# Patient Record
Sex: FEMALE | Race: WHITE | NOT HISPANIC OR LATINO | Employment: UNEMPLOYED | URBAN - NONMETROPOLITAN AREA
[De-identification: names, ages, dates, MRNs, and addresses within clinical notes are randomized per-mention and may not be internally consistent; named-entity substitution may affect disease eponyms.]

---

## 2017-01-12 DIAGNOSIS — R30.0 DYSURIA: ICD-10-CM

## 2017-01-12 DIAGNOSIS — Z88.9 MULTIPLE ALLERGIES: ICD-10-CM

## 2017-01-12 DIAGNOSIS — K21.9 GASTROESOPHAGEAL REFLUX DISEASE WITHOUT ESOPHAGITIS: ICD-10-CM

## 2017-01-12 DIAGNOSIS — G43.C0 PERIODIC HEADACHE SYNDROME, NOT INTRACTABLE: ICD-10-CM

## 2017-01-12 DIAGNOSIS — J41.0 SIMPLE CHRONIC BRONCHITIS (HCC): ICD-10-CM

## 2017-01-12 DIAGNOSIS — F17.200 TOBACCO DEPENDENCE: ICD-10-CM

## 2017-01-12 RX ORDER — TRIAMCINOLONE ACETONIDE 55 UG/1
SPRAY, METERED NASAL
Qty: 1 INHALER | Refills: 0 | Status: SHIPPED | OUTPATIENT
Start: 2017-01-12

## 2017-03-27 ENCOUNTER — OFFICE VISIT (OUTPATIENT)
Dept: FAMILY MEDICINE CLINIC | Facility: CLINIC | Age: 47
End: 2017-03-27

## 2017-03-27 VITALS
OXYGEN SATURATION: 100 % | HEART RATE: 91 BPM | DIASTOLIC BLOOD PRESSURE: 57 MMHG | TEMPERATURE: 98.9 F | HEIGHT: 67 IN | WEIGHT: 159 LBS | BODY MASS INDEX: 24.96 KG/M2 | SYSTOLIC BLOOD PRESSURE: 114 MMHG

## 2017-03-27 DIAGNOSIS — M25.511 PAIN OF BOTH SHOULDER JOINTS: ICD-10-CM

## 2017-03-27 DIAGNOSIS — R23.2 VASOMOTOR FLUSHING: ICD-10-CM

## 2017-03-27 DIAGNOSIS — M25.512 PAIN OF BOTH SHOULDER JOINTS: ICD-10-CM

## 2017-03-27 DIAGNOSIS — G43.C0 PERIODIC HEADACHE SYNDROME, NOT INTRACTABLE: ICD-10-CM

## 2017-03-27 DIAGNOSIS — E28.39 OVARIAN FAILURE: Primary | ICD-10-CM

## 2017-03-27 DIAGNOSIS — M75.50 BURSITIS, SUBACROMIAL: ICD-10-CM

## 2017-03-27 PROCEDURE — 99213 OFFICE O/P EST LOW 20 MIN: CPT | Performed by: FAMILY MEDICINE

## 2017-03-27 RX ORDER — SULINDAC 200 MG/1
200 TABLET ORAL 2 TIMES DAILY WITH MEALS
Qty: 60 TABLET | Refills: 0 | Status: SHIPPED | OUTPATIENT
Start: 2017-03-27

## 2017-03-27 NOTE — PROGRESS NOTES
Subjective   Rashida Valles is a 46 y.o. female.     Chief Complaint   Patient presents with   • Follow-up     headaches and neck pain       History of Present Illness   Pt states she has been out of meds for approx 1m, although she does not remember last time she had meds filled; states topamax did not help in past anyway, nor the other meds she was on.    Complains of non-traumatic vivi shoulder pain; worse with movement; has been assisting in the care of her mother at home, required sig assistance with positional changes.    Pt complains also of no menstrual cycle in approx 12M; freq hot flashes; denies rashes.  The following portions of the patient's history were reviewed and updated as appropriate: allergies, current medications, past family history, past medical history, past social history, past surgical history and problem list.    Review of Systems   Constitutional: Negative for activity change, appetite change, chills, diaphoresis, fatigue, fever and unexpected weight change.   HENT: Negative for congestion, dental problem, drooling, ear discharge, ear pain, facial swelling, hearing loss, mouth sores, nosebleeds, postnasal drip, rhinorrhea, sinus pressure, sneezing, sore throat, tinnitus, trouble swallowing and voice change.    Eyes: Negative for photophobia, pain, discharge, redness, itching and visual disturbance.   Respiratory: Negative for apnea, cough, choking, chest tightness, shortness of breath, wheezing and stridor.    Cardiovascular: Negative for chest pain, palpitations and leg swelling.   Gastrointestinal: Negative for abdominal distention, abdominal pain, anal bleeding, blood in stool, constipation, diarrhea, nausea, rectal pain and vomiting.   Endocrine: Negative for cold intolerance, heat intolerance, polydipsia, polyphagia and polyuria.   Genitourinary: Negative for decreased urine volume, difficulty urinating, dysuria, enuresis, flank pain, frequency, genital sores, hematuria and  urgency.        No menses; hot flashes last approx 60-90 sec, very frequent.   Musculoskeletal: Positive for arthralgias. Negative for back pain, gait problem, joint swelling, myalgias, neck pain and neck stiffness.   Skin: Negative for color change, pallor, rash and wound.   Allergic/Immunologic: Negative for food allergies and immunocompromised state.   Neurological: Negative for dizziness, tremors, seizures, syncope, facial asymmetry, speech difficulty, weakness, light-headedness, numbness and headaches.   Hematological: Negative for adenopathy. Does not bruise/bleed easily.   Psychiatric/Behavioral: Negative for agitation, behavioral problems, confusion, decreased concentration, dysphoric mood, hallucinations, self-injury, sleep disturbance and suicidal ideas. The patient is not nervous/anxious and is not hyperactive.        Patient Active Problem List   Diagnosis   • Routine medical exam   • BMI 26.0-26.9,adult   • Periodic headache syndrome, not intractable   • Tobacco dependence   • Postmenopausal   • Personal history of ovarian cancer   • Gastroesophageal reflux disease without esophagitis   • History of anemia   • H/O iron deficiency anemia   • Dysuria   • UTI (urinary tract infection), uncomplicated       Current Outpatient Prescriptions on File Prior to Visit   Medication Sig Dispense Refill   • Diphenhydramine-APAP, sleep, (TYLENOL PM EXTRA STRENGTH PO) Take  by mouth.     • albuterol (PROVENTIL HFA;VENTOLIN HFA) 108 (90 BASE) MCG/ACT inhaler Inhale 2 puffs Every 4 (Four) Hours As Needed for wheezing. 18 g 11   • NASACORT ALLERGY 24HR 55 MCG/ACT nasal inhaler instill 2 sprays into each nostril once daily 1 inhaler 0   • propranolol (INDERAL) 10 MG tablet Take 2 tablets by mouth 2 (Two) Times a Day. 60 tablet 1   • topiramate (TOPAMAX) 25 MG tablet Take 2 tablets by mouth 2 (Two) Times a Day. 120 tablet 1   • [DISCONTINUED] levoFLOXacin (LEVAQUIN) 500 MG tablet Take 1 tablet by mouth Daily. 10 tablet 0  "    No current facility-administered medications on file prior to visit.        Social History     Social History   • Marital status:      Spouse name: N/A   • Number of children: N/A   • Years of education: N/A     Occupational History   • Not on file.     Social History Main Topics   • Smoking status: Current Every Day Smoker     Packs/day: 2.00     Types: Cigarettes   • Smokeless tobacco: Not on file   • Alcohol use No   • Drug use: No   • Sexual activity: Defer     Other Topics Concern   • Not on file     Social History Narrative       Objective   Blood pressure 114/57, pulse 91, temperature 98.9 °F (37.2 °C), height 67\" (170.2 cm), weight 159 lb (72.1 kg), SpO2 100 %.     Physical Exam   Constitutional: She is oriented to person, place, and time. She appears well-developed and well-nourished. No distress.   HENT:   Head: Normocephalic and atraumatic.   Right Ear: External ear normal.   Left Ear: External ear normal.   Nose: Nose normal.   Mouth/Throat: Oropharynx is clear and moist. No oropharyngeal exudate.   Eyes: Conjunctivae and EOM are normal. Pupils are equal, round, and reactive to light. Right eye exhibits no discharge. Left eye exhibits no discharge. No scleral icterus.   Neck: Normal range of motion. Neck supple. No JVD present. No tracheal deviation present. No thyromegaly present.   Cardiovascular: Normal rate, normal heart sounds and intact distal pulses.  Exam reveals no gallop and no friction rub.    No murmur heard.  Pulmonary/Chest: Effort normal and breath sounds normal. No stridor. No respiratory distress. She has no wheezes. She has no rales. She exhibits no tenderness.   Abdominal: Soft. Bowel sounds are normal. She exhibits no distension and no mass. There is no tenderness. There is no rebound and no guarding. No hernia.   Genitourinary:   Genitourinary Comments: Pt defers   Musculoskeletal: Normal range of motion. She exhibits tenderness (lateral subacromial space vivi, worse to " R). She exhibits no edema or deformity.   Lymphadenopathy:     She has no cervical adenopathy.   Neurological: She is alert and oriented to person, place, and time. She has normal reflexes. She displays normal reflexes. No cranial nerve deficit. She exhibits normal muscle tone. Coordination normal.   Skin: Skin is warm. No rash noted. She is not diaphoretic. No erythema. No pallor.   Psychiatric: She has a normal mood and affect. Her behavior is normal. Judgment and thought content normal.   Nursing note and vitals reviewed.      Results for orders placed or performed in visit on 10/14/16   Urine Culture   Result Value Ref Range    Urine Culture 50,000-60,000 CFU/mL Klebsiella oxytoca ESBL (C)        Susceptibility    Klebsiella oxytoca ESBL - GLORIA     Ertapenem <=1 Susceptible ug/ml     Gentamicin <=4 Susceptible ug/ml     Levofloxacin <=2 Susceptible ug/ml     Meropenem <=1 Susceptible ug/ml     Nitrofurantoin <=32 Susceptible ug/ml     Tetracycline <=4 Susceptible ug/ml     Tobramycin <=4 Susceptible ug/ml     Trimethoprim + Sulfamethoxazole <=2/38 Susceptible ug/ml   POC Urinalysis Dipstick   Result Value Ref Range    Color Yellow Yellow, Straw, Dark Yellow, Kalyn    Clarity, UA Cloudy (A) Clear    Glucose, UA Negative Negative mg/dL    Bilirubin Negative Negative    Ketones, UA Negative Negative    Specific Gravity  1.015 1.005 - 1.030    Blood, UA Large (A) Negative    pH, Urine 5.5 5.0 - 8.0    Protein, POC 30 mg/dL (A) Negative mg/dL    Urobilinogen, UA Normal Normal    Leukocytes Large (3+) (A) Negative    Nitrite, UA Negative Negative       Assessment/Plan   Problems Addressed this Visit        Cardiovascular and Mediastinum    Periodic headache syndrome, not intractable    Relevant Medications    sulindac (CLINORIL) 200 MG tablet      Other Visit Diagnoses     Ovarian failure    -  Primary    Relevant Orders    Ambulatory Referral to Gynecology    Vasomotor flushing        Relevant Orders    Ambulatory  Referral to Gynecology    Bursitis, subacromial        Relevant Medications    sulindac (CLINORIL) 200 MG tablet    Pain of both shoulder joints        Relevant Medications    sulindac (CLINORIL) 200 MG tablet               Discussion/Summary:  Discussed plan of care in detail with pt today; pt verb understanding and agrees; counseled for approx 15 min of total 25 min exam time.    Pt verb understanding of stretching exercises for bursitis; ice compress daily and prn; sulindac bid with food prn.    To gyn for eval of menstrual issues.  There are no Patient Instructions on file for this visit.

## 2017-03-28 DIAGNOSIS — Z88.9 MULTIPLE ALLERGIES: ICD-10-CM

## 2017-03-28 DIAGNOSIS — M75.50 BURSITIS, SUBACROMIAL: ICD-10-CM

## 2017-03-28 DIAGNOSIS — G43.C0 PERIODIC HEADACHE SYNDROME, NOT INTRACTABLE: ICD-10-CM

## 2017-03-28 DIAGNOSIS — M25.511 PAIN OF BOTH SHOULDER JOINTS: ICD-10-CM

## 2017-03-28 DIAGNOSIS — K21.9 GASTROESOPHAGEAL REFLUX DISEASE WITHOUT ESOPHAGITIS: ICD-10-CM

## 2017-03-28 DIAGNOSIS — M25.512 PAIN OF BOTH SHOULDER JOINTS: ICD-10-CM

## 2017-03-28 DIAGNOSIS — R30.0 DYSURIA: ICD-10-CM

## 2017-03-28 DIAGNOSIS — F17.200 TOBACCO DEPENDENCE: ICD-10-CM

## 2017-03-28 DIAGNOSIS — J41.0 SIMPLE CHRONIC BRONCHITIS (HCC): ICD-10-CM

## 2017-03-28 RX ORDER — TRIAMCINOLONE ACETONIDE 55 UG/1
SPRAY, METERED NASAL
Refills: 0 | OUTPATIENT
Start: 2017-03-28

## 2017-03-28 RX ORDER — PROPRANOLOL HYDROCHLORIDE 10 MG/1
TABLET ORAL
Qty: 60 TABLET | Refills: 1 | OUTPATIENT
Start: 2017-03-28

## 2017-03-28 RX ORDER — TOPIRAMATE 25 MG/1
TABLET ORAL
Qty: 120 TABLET | Refills: 1 | OUTPATIENT
Start: 2017-03-28

## 2017-03-28 RX ORDER — SULINDAC 200 MG/1
TABLET ORAL
Qty: 60 TABLET | Refills: 0 | OUTPATIENT
Start: 2017-03-28

## 2017-04-23 DIAGNOSIS — Z88.9 MULTIPLE ALLERGIES: ICD-10-CM

## 2017-04-23 DIAGNOSIS — J41.0 SIMPLE CHRONIC BRONCHITIS (HCC): ICD-10-CM

## 2017-04-23 DIAGNOSIS — F17.200 TOBACCO DEPENDENCE: ICD-10-CM

## 2017-04-23 DIAGNOSIS — K21.9 GASTROESOPHAGEAL REFLUX DISEASE WITHOUT ESOPHAGITIS: ICD-10-CM

## 2017-04-23 DIAGNOSIS — G43.C0 PERIODIC HEADACHE SYNDROME, NOT INTRACTABLE: ICD-10-CM

## 2017-04-23 DIAGNOSIS — R30.0 DYSURIA: ICD-10-CM

## 2017-04-24 RX ORDER — TOPIRAMATE 25 MG/1
TABLET ORAL
Qty: 120 TABLET | Refills: 1 | Status: SHIPPED | OUTPATIENT
Start: 2017-04-24

## 2019-12-24 ENCOUNTER — HOSPITAL ENCOUNTER (EMERGENCY)
Facility: HOSPITAL | Age: 49
Discharge: HOME | End: 2019-12-24
Attending: STUDENT IN AN ORGANIZED HEALTH CARE EDUCATION/TRAINING PROGRAM | Admitting: STUDENT IN AN ORGANIZED HEALTH CARE EDUCATION/TRAINING PROGRAM

## 2019-12-24 ENCOUNTER — APPOINTMENT (OUTPATIENT)
Dept: GENERAL RADIOLOGY | Facility: HOSPITAL | Age: 49
End: 2019-12-24

## 2019-12-24 VITALS
OXYGEN SATURATION: 96 % | HEIGHT: 67 IN | HEART RATE: 86 BPM | TEMPERATURE: 98.5 F | BODY MASS INDEX: 31.17 KG/M2 | DIASTOLIC BLOOD PRESSURE: 76 MMHG | RESPIRATION RATE: 20 BRPM | WEIGHT: 198.6 LBS | SYSTOLIC BLOOD PRESSURE: 98 MMHG

## 2019-12-24 DIAGNOSIS — R00.2 HEART PALPITATIONS: ICD-10-CM

## 2019-12-24 DIAGNOSIS — S46.912A LEFT SHOULDER STRAIN, INITIAL ENCOUNTER: Primary | ICD-10-CM

## 2019-12-24 LAB
ALBUMIN SERPL-MCNC: 4.2 G/DL (ref 3.5–5.2)
ALBUMIN/GLOB SERPL: 1.3 G/DL
ALP SERPL-CCNC: 150 U/L (ref 39–117)
ALT SERPL W P-5'-P-CCNC: 36 U/L (ref 1–33)
ANION GAP SERPL CALCULATED.3IONS-SCNC: 13.4 MMOL/L (ref 5–15)
AST SERPL-CCNC: 19 U/L (ref 1–32)
BASOPHILS # BLD AUTO: 0.04 10*3/MM3 (ref 0–0.2)
BASOPHILS NFR BLD AUTO: 0.6 % (ref 0–1.5)
BILIRUB SERPL-MCNC: <0.2 MG/DL (ref 0.2–1.2)
BUN BLD-MCNC: 15 MG/DL (ref 6–20)
BUN/CREAT SERPL: 18.1 (ref 7–25)
CALCIUM SPEC-SCNC: 9.4 MG/DL (ref 8.6–10.5)
CHLORIDE SERPL-SCNC: 105 MMOL/L (ref 98–107)
CO2 SERPL-SCNC: 20.6 MMOL/L (ref 22–29)
CREAT BLD-MCNC: 0.83 MG/DL (ref 0.57–1)
D DIMER PPP FEU-MCNC: 0.48 MCGFEU/ML (ref 0–0.57)
DEPRECATED RDW RBC AUTO: 41.8 FL (ref 37–54)
EOSINOPHIL # BLD AUTO: 0.13 10*3/MM3 (ref 0–0.4)
EOSINOPHIL NFR BLD AUTO: 1.8 % (ref 0.3–6.2)
ERYTHROCYTE [DISTWIDTH] IN BLOOD BY AUTOMATED COUNT: 12.7 % (ref 12.3–15.4)
GFR SERPL CREATININE-BSD FRML MDRD: 73 ML/MIN/1.73
GLOBULIN UR ELPH-MCNC: 3.3 GM/DL
GLUCOSE BLD-MCNC: 192 MG/DL (ref 65–99)
HCT VFR BLD AUTO: 43.3 % (ref 34–46.6)
HGB BLD-MCNC: 14.4 G/DL (ref 12–15.9)
HOLD SPECIMEN: NORMAL
HOLD SPECIMEN: NORMAL
IMM GRANULOCYTES # BLD AUTO: 0.01 10*3/MM3 (ref 0–0.05)
IMM GRANULOCYTES NFR BLD AUTO: 0.1 % (ref 0–0.5)
LYMPHOCYTES # BLD AUTO: 2.36 10*3/MM3 (ref 0.7–3.1)
LYMPHOCYTES NFR BLD AUTO: 33.4 % (ref 19.6–45.3)
MCH RBC QN AUTO: 29.8 PG (ref 26.6–33)
MCHC RBC AUTO-ENTMCNC: 33.3 G/DL (ref 31.5–35.7)
MCV RBC AUTO: 89.5 FL (ref 79–97)
MONOCYTES # BLD AUTO: 0.31 10*3/MM3 (ref 0.1–0.9)
MONOCYTES NFR BLD AUTO: 4.4 % (ref 5–12)
NEUTROPHILS # BLD AUTO: 4.21 10*3/MM3 (ref 1.7–7)
NEUTROPHILS NFR BLD AUTO: 59.7 % (ref 42.7–76)
NRBC BLD AUTO-RTO: 0 /100 WBC (ref 0–0.2)
NT-PROBNP SERPL-MCNC: 111.3 PG/ML (ref 5–900)
PLATELET # BLD AUTO: 306 10*3/MM3 (ref 140–450)
PMV BLD AUTO: 10 FL (ref 6–12)
POTASSIUM BLD-SCNC: 4.1 MMOL/L (ref 3.5–5.2)
PROT SERPL-MCNC: 7.5 G/DL (ref 6–8.5)
RBC # BLD AUTO: 4.84 10*6/MM3 (ref 3.77–5.28)
SODIUM BLD-SCNC: 139 MMOL/L (ref 136–145)
TROPONIN I SERPL-MCNC: 0 NG/ML (ref 0–0.05)
TROPONIN T SERPL-MCNC: <0.01 NG/ML (ref 0–0.03)
WBC NRBC COR # BLD: 7.06 10*3/MM3 (ref 3.4–10.8)
WHOLE BLOOD HOLD SPECIMEN: NORMAL
WHOLE BLOOD HOLD SPECIMEN: NORMAL

## 2019-12-24 PROCEDURE — 94799 UNLISTED PULMONARY SVC/PX: CPT

## 2019-12-24 PROCEDURE — 83880 ASSAY OF NATRIURETIC PEPTIDE: CPT | Performed by: NURSE PRACTITIONER

## 2019-12-24 PROCEDURE — 71045 X-RAY EXAM CHEST 1 VIEW: CPT

## 2019-12-24 PROCEDURE — 85025 COMPLETE CBC W/AUTO DIFF WBC: CPT | Performed by: STUDENT IN AN ORGANIZED HEALTH CARE EDUCATION/TRAINING PROGRAM

## 2019-12-24 PROCEDURE — 93005 ELECTROCARDIOGRAM TRACING: CPT

## 2019-12-24 PROCEDURE — 84484 ASSAY OF TROPONIN QUANT: CPT | Performed by: STUDENT IN AN ORGANIZED HEALTH CARE EDUCATION/TRAINING PROGRAM

## 2019-12-24 PROCEDURE — 96372 THER/PROPH/DIAG INJ SC/IM: CPT

## 2019-12-24 PROCEDURE — 73030 X-RAY EXAM OF SHOULDER: CPT

## 2019-12-24 PROCEDURE — 80053 COMPREHEN METABOLIC PANEL: CPT | Performed by: STUDENT IN AN ORGANIZED HEALTH CARE EDUCATION/TRAINING PROGRAM

## 2019-12-24 PROCEDURE — 25010000002 METHYLPREDNISOLONE PER 125 MG: Performed by: NURSE PRACTITIONER

## 2019-12-24 PROCEDURE — 25010000002 KETOROLAC TROMETHAMINE PER 15 MG: Performed by: NURSE PRACTITIONER

## 2019-12-24 PROCEDURE — 94640 AIRWAY INHALATION TREATMENT: CPT

## 2019-12-24 PROCEDURE — 99283 EMERGENCY DEPT VISIT LOW MDM: CPT

## 2019-12-24 PROCEDURE — 85379 FIBRIN DEGRADATION QUANT: CPT | Performed by: NURSE PRACTITIONER

## 2019-12-24 PROCEDURE — 93005 ELECTROCARDIOGRAM TRACING: CPT | Performed by: STUDENT IN AN ORGANIZED HEALTH CARE EDUCATION/TRAINING PROGRAM

## 2019-12-24 RX ORDER — KETOROLAC TROMETHAMINE 30 MG/ML
30 INJECTION, SOLUTION INTRAMUSCULAR; INTRAVENOUS ONCE
Status: DISCONTINUED | OUTPATIENT
Start: 2019-12-24 | End: 2019-12-24

## 2019-12-24 RX ORDER — KETOROLAC TROMETHAMINE 30 MG/ML
30 INJECTION, SOLUTION INTRAMUSCULAR; INTRAVENOUS ONCE
Status: COMPLETED | OUTPATIENT
Start: 2019-12-24 | End: 2019-12-24

## 2019-12-24 RX ORDER — MELOXICAM 7.5 MG/1
7.5 TABLET ORAL DAILY
Qty: 14 TABLET | Refills: 0 | Status: SHIPPED | OUTPATIENT
Start: 2019-12-24

## 2019-12-24 RX ORDER — IPRATROPIUM BROMIDE AND ALBUTEROL SULFATE 2.5; .5 MG/3ML; MG/3ML
3 SOLUTION RESPIRATORY (INHALATION) ONCE
Status: COMPLETED | OUTPATIENT
Start: 2019-12-24 | End: 2019-12-24

## 2019-12-24 RX ORDER — METHYLPREDNISOLONE SODIUM SUCCINATE 125 MG/2ML
80 INJECTION, POWDER, LYOPHILIZED, FOR SOLUTION INTRAMUSCULAR; INTRAVENOUS ONCE
Status: COMPLETED | OUTPATIENT
Start: 2019-12-24 | End: 2019-12-24

## 2019-12-24 RX ORDER — METHYLPREDNISOLONE SODIUM SUCCINATE 125 MG/2ML
125 INJECTION, POWDER, LYOPHILIZED, FOR SOLUTION INTRAMUSCULAR; INTRAVENOUS ONCE
Status: DISCONTINUED | OUTPATIENT
Start: 2019-12-24 | End: 2019-12-24

## 2019-12-24 RX ORDER — SODIUM CHLORIDE 0.9 % (FLUSH) 0.9 %
10 SYRINGE (ML) INJECTION AS NEEDED
Status: DISCONTINUED | OUTPATIENT
Start: 2019-12-24 | End: 2019-12-24 | Stop reason: HOSPADM

## 2019-12-24 RX ADMIN — IPRATROPIUM BROMIDE AND ALBUTEROL SULFATE 3 ML: .5; 3 SOLUTION RESPIRATORY (INHALATION) at 19:11

## 2019-12-24 RX ADMIN — KETOROLAC TROMETHAMINE 30 MG: 30 INJECTION, SOLUTION INTRAMUSCULAR at 19:48

## 2019-12-24 RX ADMIN — METHYLPREDNISOLONE SODIUM SUCCINATE 80 MG: 125 INJECTION, POWDER, FOR SOLUTION INTRAMUSCULAR; INTRAVENOUS at 19:48

## 2019-12-24 NOTE — ED PROVIDER NOTES
Subjective   History of Present Illness  This is a 49-year-old female who comes in today complaining of left shoulder pain.  She reports she was picking up her grandchild 6 days ago when she felt pain in her left shoulder.  At that time is been hurting with any movement.  She also reports about 4 days ago she started having some heart palpitations.  She denies any chest pain, nausea, vomiting.  She reports she does have COPD and has been using her inhaler more than normal.  Review of Systems   Constitutional: Negative.    HENT: Negative.    Eyes: Negative.    Respiratory: Negative.    Cardiovascular: Positive for palpitations.   Gastrointestinal: Negative.    Endocrine: Negative.    Genitourinary: Negative.    Musculoskeletal: Positive for arthralgias.   Skin: Negative.    Allergic/Immunologic: Negative.    Neurological: Negative.    Hematological: Negative.    Psychiatric/Behavioral: Negative.        Past Medical History:   Diagnosis Date   • Acid reflux    • Anemia    • Asthma    • COPD (chronic obstructive pulmonary disease) (CMS/[facility])    • Fibromyalgia, primary    • Fractures    • Low back pain    • Neck pain    • Ovarian cyst    • Shoulder pain        Allergies   Allergen Reactions   • Baclofen    • Morphine And Related        Past Surgical History:   Procedure Laterality Date   • CHOLECYSTECTOMY     • COSMETIC SURGERY     • HYSTERECTOMY     • MOUTH SURGERY     • TUBAL ABDOMINAL LIGATION         Family History   Problem Relation Age of Onset   • Cancer Mother    • Diabetes Mother    • Heart disease Mother    • Cancer Father    • Diabetes Father    • Heart disease Father    • Arthritis Father    • Asthma Father        Social History     Socioeconomic History   • Marital status:      Spouse name: Not on file   • Number of children: Not on file   • Years of education: Not on file   • Highest education level: Not on file   Tobacco Use   • Smoking status: Current Every Day Smoker     Packs/day: 2.00      Types: Cigarettes   Substance and Sexual Activity   • Alcohol use: No   • Drug use: No   • Sexual activity: Defer           Objective   Physical Exam   Constitutional: She appears well-developed and well-nourished.   Nursing note and vitals reviewed.  GEN: No acute distress  Head: Normocephalic, atraumatic  Eyes: Pupils equal round reactive to light  ENT: Posterior pharynx normal in appearance, oral mucosa is moist  Chest: Nontender to palpation  Cardiovascular: Regular rate  Lungs: Clear to auscultation bilaterally  Abdomen: Soft, nontender, nondistended, no peritoneal signs  Extremities: No edema, normal appearance, tender left shoulder with limited ROM due to pain.   Neuro: GCS 15  Psych: Mood and affect are appropriate      Procedures           ED Course  ED Course as of Dec 26 1232   Tue Dec 24, 2019   1936 I discussed the findings with the patient.  Have advised her to follow-up with her primary care provider in the next 24 to 48 hours.  Have given her strict return to care instructions and she is agreeable with this plan of care.    [TW]   Wed Dec 25, 2019   0740 EKG interpreted by me reveals a sinus rhythm rate 95.  No increasing change.  Normal EKG.    [PF]      ED Course User Index  [PF] Gunnar Martines, DO  [TW] Viridiana Stein, APRN                                               MDM  Number of Diagnoses or Management Options     Amount and/or Complexity of Data Reviewed  Clinical lab tests: ordered and reviewed  Tests in the radiology section of CPT®: ordered and reviewed  Review and summarize past medical records: yes  Discuss the patient with other providers: yes  Independent visualization of images, tracings, or specimens: yes    Risk of Complications, Morbidity, and/or Mortality  Presenting problems: moderate  Diagnostic procedures: moderate  Management options: moderate        Final diagnoses:   Left shoulder strain, initial encounter   Heart palpitations            Viridiana Stein,  APRN  12/24/19 1937       Viridiana Stein, APRN  12/26/19 1236

## 2021-05-01 ENCOUNTER — HOSPITAL ENCOUNTER (EMERGENCY)
Facility: HOSPITAL | Age: 51
Discharge: ANOTHER HOSPITAL | End: 2021-05-01
Attending: EMERGENCY MEDICINE | Admitting: EMERGENCY MEDICINE

## 2021-05-01 ENCOUNTER — APPOINTMENT (OUTPATIENT)
Dept: GENERAL RADIOLOGY | Facility: HOSPITAL | Age: 51
End: 2021-05-01

## 2021-05-01 VITALS
BODY MASS INDEX: 32.59 KG/M2 | DIASTOLIC BLOOD PRESSURE: 91 MMHG | WEIGHT: 202.8 LBS | HEIGHT: 66 IN | RESPIRATION RATE: 18 BRPM | SYSTOLIC BLOOD PRESSURE: 148 MMHG | HEART RATE: 75 BPM | TEMPERATURE: 98.8 F | OXYGEN SATURATION: 98 %

## 2021-05-01 DIAGNOSIS — M65.9 FLEXOR TENOSYNOVITIS OF FINGER: ICD-10-CM
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
## 2021-05-01 LAB
ALBUMIN SERPL-MCNC: 4.3 G/DL (ref 3.5–5.2)
ALBUMIN/GLOB SERPL: 1.3 G/DL
ALP SERPL-CCNC: 160 U/L (ref 39–117)
ALT SERPL W P-5'-P-CCNC: 131 U/L (ref 1–33)
ANION GAP SERPL CALCULATED.3IONS-SCNC: 11.4 MMOL/L (ref 5–15)
AST SERPL-CCNC: 121 U/L (ref 1–32)
BASOPHILS # BLD AUTO: 0.05 10*3/MM3 (ref 0–0.2)
BASOPHILS NFR BLD AUTO: 0.5 % (ref 0–1.5)
BILIRUB SERPL-MCNC: 0.5 MG/DL (ref 0–1.2)
BUN SERPL-MCNC: 13 MG/DL (ref 6–20)
BUN/CREAT SERPL: 15.7 (ref 7–25)
CALCIUM SPEC-SCNC: 9.5 MG/DL (ref 8.6–10.5)
CHLORIDE SERPL-SCNC: 101 MMOL/L (ref 98–107)
CO2 SERPL-SCNC: 23.6 MMOL/L (ref 22–29)
CREAT SERPL-MCNC: 0.83 MG/DL (ref 0.57–1)
CRP SERPL-MCNC: 2.07 MG/DL (ref 0–0.5)
D-LACTATE SERPL-SCNC: 0.5 MMOL/L (ref 0.5–2)
DEPRECATED RDW RBC AUTO: 41.8 FL (ref 37–54)
EOSINOPHIL # BLD AUTO: 0.08 10*3/MM3 (ref 0–0.4)
EOSINOPHIL NFR BLD AUTO: 0.8 % (ref 0.3–6.2)
ERYTHROCYTE [DISTWIDTH] IN BLOOD BY AUTOMATED COUNT: 12.6 % (ref 12.3–15.4)
ERYTHROCYTE [SEDIMENTATION RATE] IN BLOOD: 17 MM/HR (ref 0–20)
GFR SERPL CREATININE-BSD FRML MDRD: 72 ML/MIN/1.73
GLOBULIN UR ELPH-MCNC: 3.2 GM/DL
GLUCOSE SERPL-MCNC: 96 MG/DL (ref 65–99)
HCT VFR BLD AUTO: 43.7 % (ref 34–46.6)
HGB BLD-MCNC: 14.6 G/DL (ref 12–15.9)
IMM GRANULOCYTES # BLD AUTO: 0.02 10*3/MM3 (ref 0–0.05)
IMM GRANULOCYTES NFR BLD AUTO: 0.2 % (ref 0–0.5)
LYMPHOCYTES # BLD AUTO: 2.91 10*3/MM3 (ref 0.7–3.1)
LYMPHOCYTES NFR BLD AUTO: 28.4 % (ref 19.6–45.3)
MCH RBC QN AUTO: 29.9 PG (ref 26.6–33)
MCHC RBC AUTO-ENTMCNC: 33.4 G/DL (ref 31.5–35.7)
MCV RBC AUTO: 89.4 FL (ref 79–97)
MONOCYTES # BLD AUTO: 0.55 10*3/MM3 (ref 0.1–0.9)
MONOCYTES NFR BLD AUTO: 5.4 % (ref 5–12)
NEUTROPHILS NFR BLD AUTO: 6.65 10*3/MM3 (ref 1.7–7)
NEUTROPHILS NFR BLD AUTO: 64.7 % (ref 42.7–76)
NRBC BLD AUTO-RTO: 0 /100 WBC (ref 0–0.2)
PLATELET # BLD AUTO: 318 10*3/MM3 (ref 140–450)
PMV BLD AUTO: 9.6 FL (ref 6–12)
POTASSIUM SERPL-SCNC: 4 MMOL/L (ref 3.5–5.2)
PROCALCITONIN SERPL-MCNC: 0.2 NG/ML (ref 0–0.25)
PROT SERPL-MCNC: 7.5 G/DL (ref 6–8.5)
RBC # BLD AUTO: 4.89 10*6/MM3 (ref 3.77–5.28)
SODIUM SERPL-SCNC: 136 MMOL/L (ref 136–145)
WBC # BLD AUTO: 10.26 10*3/MM3 (ref 3.4–10.8)

## 2021-05-01 PROCEDURE — 96375 TX/PRO/DX INJ NEW DRUG ADDON: CPT

## 2021-05-01 PROCEDURE — 86140 C-REACTIVE PROTEIN: CPT | Performed by: EMERGENCY MEDICINE

## 2021-05-01 PROCEDURE — 85025 COMPLETE CBC W/AUTO DIFF WBC: CPT | Performed by: EMERGENCY MEDICINE

## 2021-05-01 PROCEDURE — 96365 THER/PROPH/DIAG IV INF INIT: CPT

## 2021-05-01 PROCEDURE — 85651 RBC SED RATE NONAUTOMATED: CPT | Performed by: EMERGENCY MEDICINE

## 2021-05-01 PROCEDURE — 99283 EMERGENCY DEPT VISIT LOW MDM: CPT

## 2021-05-01 PROCEDURE — 25010000002 FENTANYL CITRATE (PF) 100 MCG/2ML SOLUTION: Performed by: EMERGENCY MEDICINE

## 2021-05-01 PROCEDURE — 73140 X-RAY EXAM OF FINGER(S): CPT

## 2021-05-01 PROCEDURE — 25010000002 KETOROLAC TROMETHAMINE PER 15 MG: Performed by: EMERGENCY MEDICINE

## 2021-05-01 PROCEDURE — 25010000003 AMPICILLIN-SULBACTAM PER 1.5 G: Performed by: EMERGENCY MEDICINE

## 2021-05-01 PROCEDURE — 84145 PROCALCITONIN (PCT): CPT | Performed by: EMERGENCY MEDICINE

## 2021-05-01 PROCEDURE — 99284 EMERGENCY DEPT VISIT MOD MDM: CPT

## 2021-05-01 PROCEDURE — 80053 COMPREHEN METABOLIC PANEL: CPT | Performed by: EMERGENCY MEDICINE

## 2021-05-01 PROCEDURE — 83605 ASSAY OF LACTIC ACID: CPT | Performed by: EMERGENCY MEDICINE

## 2021-05-01 RX ORDER — KETOROLAC TROMETHAMINE 30 MG/ML
30 INJECTION, SOLUTION INTRAMUSCULAR; INTRAVENOUS ONCE
Status: COMPLETED | OUTPATIENT
Start: 2021-05-01 | End: 2021-05-01

## 2021-05-01 RX ORDER — FENTANYL CITRATE 50 UG/ML
50 INJECTION, SOLUTION INTRAMUSCULAR; INTRAVENOUS ONCE
Status: COMPLETED | OUTPATIENT
Start: 2021-05-01 | End: 2021-05-01

## 2021-05-01 RX ADMIN — SODIUM CHLORIDE 3 G: 900 INJECTION INTRAVENOUS at 20:51

## 2021-05-01 RX ADMIN — FENTANYL CITRATE 50 MCG: 50 INJECTION, SOLUTION INTRAMUSCULAR; INTRAVENOUS at 20:51

## 2021-05-01 RX ADMIN — KETOROLAC TROMETHAMINE 30 MG: 30 INJECTION, SOLUTION INTRAMUSCULAR; INTRAVENOUS at 20:51

## 2021-05-02 NOTE — ED PROVIDER NOTES
"Subjective   50-year-old female presenting with dog bite.  She states that yesterday she was bitten on the left index finger by a \"talisha\" (Chihuahua/dachshund mix).  She tells me that she immediately washed it out and use peroxide.  She states that all day today she has had increasing pain in the finger, cannot straighten it out, has had redness extending down to the palm of the hand.  No fevers, vomiting or other complaints.          Review of Systems   Constitutional: Negative.    HENT: Negative.    Eyes: Negative.    Respiratory: Negative.    Cardiovascular: Negative.    Gastrointestinal: Negative.    Genitourinary: Negative.    Musculoskeletal: Positive for arthralgias and joint swelling.   Skin: Positive for color change and wound.   Neurological: Negative.    Psychiatric/Behavioral: Negative.        Past Medical History:   Diagnosis Date   • Acid reflux    • Anemia    • Asthma    • COPD (chronic obstructive pulmonary disease) (CMS/[facility])    • Fibromyalgia, primary    • Fractures    • Low back pain    • Neck pain    • Ovarian cyst    • Shoulder pain        Allergies   Allergen Reactions   • Baclofen    • Morphine And Related        Past Surgical History:   Procedure Laterality Date   • CHOLECYSTECTOMY     • COSMETIC SURGERY     • HYSTERECTOMY     • MOUTH SURGERY     • TUBAL ABDOMINAL LIGATION         Family History   Problem Relation Age of Onset   • Cancer Mother    • Diabetes Mother    • Heart disease Mother    • Cancer Father    • Diabetes Father    • Heart disease Father    • Arthritis Father    • Asthma Father        Social History     Socioeconomic History   • Marital status:      Spouse name: Not on file   • Number of children: Not on file   • Years of education: Not on file   • Highest education level: Not on file   Tobacco Use   • Smoking status: Current Every Day Smoker     Packs/day: 2.00     Types: Cigarettes   Substance and Sexual Activity   • Alcohol use: No   • Drug use: No   • " Sexual activity: Defer           Objective   Physical Exam  Constitutional:       General: She is not in acute distress.     Appearance: Normal appearance. She is not ill-appearing, toxic-appearing or diaphoretic.   HENT:      Head: Normocephalic and atraumatic.      Right Ear: External ear normal.      Left Ear: External ear normal.      Nose: Nose normal.      Mouth/Throat:      Mouth: Mucous membranes are moist.      Pharynx: Oropharynx is clear.   Eyes:      Extraocular Movements: Extraocular movements intact.      Conjunctiva/sclera: Conjunctivae normal.      Pupils: Pupils are equal, round, and reactive to light.   Cardiovascular:      Rate and Rhythm: Normal rate and regular rhythm.      Pulses: Normal pulses.      Heart sounds: Normal heart sounds.   Pulmonary:      Effort: Pulmonary effort is normal. No respiratory distress.      Breath sounds: Normal breath sounds.   Abdominal:      General: Bowel sounds are normal. There is no distension.      Tenderness: There is no abdominal tenderness.   Musculoskeletal:      Cervical back: Normal range of motion.      Comments: Left index finger is uniformly swollen and held in flexion, there is exquisite pain with passive extension, there is tenderness along the palmar aspect of the finger   Skin:     General: Skin is warm and dry.      Capillary Refill: Capillary refill takes less than 2 seconds.      Findings: No rash.      Comments: Several small puncture wounds to the dorsum and volar aspect of the left index finger, there is mild redness to the volar aspect extending to the palm of the hand   Neurological:      General: No focal deficit present.      Mental Status: She is alert and oriented to person, place, and time.   Psychiatric:         Mood and Affect: Mood normal.         Behavior: Behavior normal.         Procedures           ED Course                                           MDM  Number of Diagnoses or Management Options  Dog bite, initial  encounter  Flexor tenosynovitis of finger  Diagnosis management comments: 50-year-old female with dog bite, finger swelling.  Well-developed, well-nourished lady in no distress with exam as above.  She has no signs or symptoms of systemic illness.  However based on her exam I suspect she has early flexor tenosynovitis.  Will obtain basic labs, x-ray and give antibiotics in addition to pain medicine.  Disposition pending though anticipate discussion with hand surgery.    Ddx: dog bite, cellulitis, abscess, flexor tenosynovitis     Labs notable for mildly elevated CRP.  X-ray per my interpretation of all soft tissue swelling.  She does feel better after treatment here.  Again patient does exhibit all of Kanavel's 4 cardinal signs.  I did discuss the case with Dr. Teran,  transfer center, accepted in transfer to the  ER for further evaluation.       Amount and/or Complexity of Data Reviewed  Decide to obtain previous medical records or to obtain history from someone other than the patient: yes        Final diagnoses:   Dog bite, initial encounter   Flexor tenosynovitis of finger        David Centeno MD  05/01/21 0081

## 2021-05-02 NOTE — ED NOTES
Pt accidentally removed IV and did not want another one placed so Vancomycin will not be administered prior to leaving; pt will be going to  POV per pt request and allowed by Taylor Mckinley, RN  05/01/21 [id number]

## 2021-05-02 NOTE — ED NOTES
MD's called per Dr. Centeno requesting Hand Services. Dr. Beck on the phone. Call transferred.      Brian Montes  05/01/21 0758

## 2023-09-05 ENCOUNTER — HOSPITAL ENCOUNTER (EMERGENCY)
Facility: HOSPITAL | Age: 53
Discharge: HOME | End: 2023-09-05
Attending: EMERGENCY MEDICINE
Payer: COMMERCIAL

## 2023-09-05 ENCOUNTER — APPOINTMENT (OUTPATIENT)
Dept: GENERAL RADIOLOGY | Facility: HOSPITAL | Age: 53
End: 2023-09-05
Payer: COMMERCIAL

## 2023-09-05 VITALS
SYSTOLIC BLOOD PRESSURE: 132 MMHG | DIASTOLIC BLOOD PRESSURE: 114 MMHG | BODY MASS INDEX: 24.91 KG/M2 | WEIGHT: 155 LBS | OXYGEN SATURATION: 99 % | RESPIRATION RATE: 18 BRPM | TEMPERATURE: 98 F | HEART RATE: 109 BPM | HEIGHT: 66 IN

## 2023-09-05 DIAGNOSIS — S82.842A CLOSED BIMALLEOLAR FRACTURE OF LEFT ANKLE, INITIAL ENCOUNTER: Primary | ICD-10-CM

## 2023-09-05 PROCEDURE — 73610 X-RAY EXAM OF ANKLE: CPT

## 2023-09-05 PROCEDURE — 99283 EMERGENCY DEPT VISIT LOW MDM: CPT

## 2023-09-05 RX ORDER — HYDROCODONE BITARTRATE AND ACETAMINOPHEN 5; 325 MG/1; MG/1
1 TABLET ORAL EVERY 6 HOURS PRN
Qty: 10 TABLET | Refills: 0 | Status: SHIPPED | OUTPATIENT
Start: 2023-09-05

## 2023-09-05 RX ORDER — HYDROCODONE BITARTRATE AND ACETAMINOPHEN 5; 325 MG/1; MG/1
1 TABLET ORAL ONCE
Status: COMPLETED | OUTPATIENT
Start: 2023-09-05 | End: 2023-09-05

## 2023-09-05 RX ADMIN — HYDROCODONE BITARTRATE AND ACETAMINOPHEN 1 TABLET: 5; 325 TABLET ORAL at 23:16

## 2023-09-06 NOTE — ED PROVIDER NOTES
Subjective  History of Present Illness:    Chief Complaint: Left ankle pain  History of Present Illness: This is a 52-year-old female patient comes into the ED today complaining of left ankle pain.  Patient states she stepped in a hole twisted her ankle laid on the ground for approximately 30 minutes before someone will call the ambulance for her.  Patient is complaining of pain of 9 out of 10 on her left ankle.      Nurses Notes reviewed and agree, including vitals, allergies, social history and prior medical history.       Allergies:    Baclofen and Morphine and related      Past Surgical History:   Procedure Laterality Date    CHOLECYSTECTOMY      COSMETIC SURGERY      HYSTERECTOMY      MOUTH SURGERY      TUBAL ABDOMINAL LIGATION           Social History     Socioeconomic History    Marital status:    Tobacco Use    Smoking status: Every Day     Packs/day: 2.00     Types: Cigarettes   Substance and Sexual Activity    Alcohol use: No    Drug use: No    Sexual activity: Defer         Family History   Problem Relation Age of Onset    Cancer Mother     Diabetes Mother     Heart disease Mother     Cancer Father     Diabetes Father     Heart disease Father     Arthritis Father     Asthma Father        REVIEW OF SYSTEMS: All systems reviewed and not pertinent unless noted.    Review of Systems   Musculoskeletal:  Positive for arthralgias, gait problem, joint swelling and myalgias.   All other systems reviewed and are negative.    Objective    Physical Exam  Vitals and nursing note reviewed.   Constitutional:       Appearance: Normal appearance.   HENT:      Head: Normocephalic and atraumatic.   Eyes:      Extraocular Movements: Extraocular movements intact.      Pupils: Pupils are equal, round, and reactive to light.   Cardiovascular:      Rate and Rhythm: Normal rate and regular rhythm.      Pulses: Normal pulses.      Heart sounds: Normal heart sounds.   Pulmonary:      Effort: Pulmonary effort is normal.       Breath sounds: Normal breath sounds.   Abdominal:      General: Abdomen is flat. Bowel sounds are normal.      Palpations: Abdomen is soft.   Musculoskeletal:         General: Swelling, tenderness and signs of injury present.      Cervical back: Normal range of motion and neck supple.      Left lower leg: Edema present.   Skin:     Capillary Refill: Capillary refill takes less than 2 seconds.   Neurological:      General: No focal deficit present.      Mental Status: She is alert and oriented to person, place, and time. Mental status is at baseline.      GCS: GCS eye subscore is 4. GCS verbal subscore is 5. GCS motor subscore is 6.      Sensory: Sensation is intact.      Motor: Motor function is intact.      Gait: Gait is intact.   Psychiatric:         Attention and Perception: Attention and perception normal.         Mood and Affect: Mood and affect normal.         Speech: Speech normal.         Behavior: Behavior normal. Behavior is cooperative.         Procedures    ED Course:         Lab Results (last 24 hours)       ** No results found for the last 24 hours. **             No radiology results from the last 24 hrs     Medical Decision Making  52-year-old female patient comes into the ED today complaining of pain to her left ankle.    DDX: includes but is not limited to: Ankle sprain, ankle fracture, contusion    Amount and/or Complexity of Data Reviewed  Radiology: ordered. Decision-making details documented in ED Course.     Details: Patient has bimalleolar fracture noted on left ankle x-ray.  Discussion of management or test interpretation with external provider(s): Discussed assessment, treatment and plan with ER attending    Risk  Prescription drug management.  Risk Details: Patient has been diagnosed with left bimalleolar fracture and will be discharged home.  Patient requested to follow-up with primary care provider and orthopedics within the next 7 days for reevaluation.                  Final diagnoses:    Closed bimalleolar fracture of left ankle, initial encounter          Santi Shah, APRN  09/05/23 [id number]

## 2023-09-07 ENCOUNTER — DOCUMENTATION (OUTPATIENT)
Dept: ORTHOPEDIC SURGERY | Facility: HOSPITAL | Age: 53
End: 2023-09-07
Payer: COMMERCIAL

## 2023-09-07 ENCOUNTER — HOSPITAL ENCOUNTER (EMERGENCY)
Facility: HOSPITAL | Age: 53
Discharge: HOME | End: 2023-09-07
Attending: STUDENT IN AN ORGANIZED HEALTH CARE EDUCATION/TRAINING PROGRAM
Payer: COMMERCIAL

## 2023-09-07 VITALS
HEIGHT: 66 IN | HEART RATE: 88 BPM | SYSTOLIC BLOOD PRESSURE: 132 MMHG | TEMPERATURE: 98.2 F | RESPIRATION RATE: 16 BRPM | BODY MASS INDEX: 24.91 KG/M2 | WEIGHT: 155 LBS | OXYGEN SATURATION: 96 % | DIASTOLIC BLOOD PRESSURE: 117 MMHG

## 2023-09-07 DIAGNOSIS — M79.672 LEFT FOOT PAIN: Primary | ICD-10-CM

## 2023-09-07 PROCEDURE — 99282 EMERGENCY DEPT VISIT SF MDM: CPT

## 2023-09-07 NOTE — ED PROVIDER NOTES
Subjective:  History of Present Illness:    Patient is a 52-year-old female here today for social determinants of health issues and left lower extremity pain.  Patient is homeless and currently has a complex fracture to her left foot and ankle.  She was initially seen here on September 5 and was offered admission but denied at that time.  She was discharged and followed up with the orthopedic clinic as scheduled today.  While she was there apparently her swelling has worsened prompting the orthopedic provider to place the patient in a cast for better support.  He attempted to find the patient a place to stay so she would have the opportunity to not bear weight and keep her leg elevated.  However he was unsuccessful at this and called the emergency department and spoke with the attending about the patient.  The patient states that she does not have her pain medicine at this time, it was left at a house she was supposedly going to stay with and was advised that she cannot stay there anymore.      Nurses Notes reviewed and agree, including vitals, allergies, social history and prior medical history.     REVIEW OF SYSTEMS: All systems reviewed and not pertinent unless noted.  Review of Systems    Past Medical History:   Diagnosis Date    Acid reflux     Anemia     Asthma     COPD (chronic obstructive pulmonary disease)     Fibromyalgia, primary     Fractures     Low back pain     Neck pain     Ovarian cyst     Shoulder pain        Allergies:    Baclofen and Morphine and related      Past Surgical History:   Procedure Laterality Date    CHOLECYSTECTOMY      COSMETIC SURGERY      HYSTERECTOMY      MOUTH SURGERY      TUBAL ABDOMINAL LIGATION           Social History     Socioeconomic History    Marital status:    Tobacco Use    Smoking status: Every Day     Packs/day: 2.00     Types: Cigarettes   Substance and Sexual Activity    Alcohol use: No    Drug use: No    Sexual activity: Defer         Family History  "  Problem Relation Age of Onset    Cancer Mother     Diabetes Mother     Heart disease Mother     Cancer Father     Diabetes Father     Heart disease Father     Arthritis Father     Asthma Father        Objective  Physical Exam:  BP (!) 132/117 (BP Location: Left arm, Patient Position: Sitting)   Pulse 88   Temp 98.2 °F (36.8 °C) (Oral)   Resp 16   Ht 167.6 cm (66\")   Wt 70.3 kg (155 lb)   SpO2 96%   BMI 25.02 kg/m²      Physical Exam  Vitals and nursing note reviewed.   Constitutional:       Appearance: Normal appearance. She is normal weight.   HENT:      Head: Normocephalic and atraumatic.   Cardiovascular:      Rate and Rhythm: Normal rate and regular rhythm.      Pulses: Normal pulses.      Heart sounds: Normal heart sounds.   Pulmonary:      Effort: Pulmonary effort is normal.      Breath sounds: Normal breath sounds.   Abdominal:      General: Abdomen is flat. Bowel sounds are normal. There is no distension.      Palpations: Abdomen is soft.      Tenderness: There is no abdominal tenderness.   Musculoskeletal:      Right lower leg: No edema.      Left lower leg: No edema.      Comments: Left lower extremity cast in place   Skin:     General: Skin is warm and dry.      Capillary Refill: Capillary refill takes less than 2 seconds.   Neurological:      General: No focal deficit present.      Mental Status: She is alert and oriented to person, place, and time.   Psychiatric:         Mood and Affect: Mood normal.         Behavior: Behavior normal.       Procedures    ED Course:         Lab Results (last 24 hours)       ** No results found for the last 24 hours. **             No radiology results from the last 24 hrs       MDM      Initial impression of presenting illness: Patient is a 52-year-old female here today for possible admission.  She does not appear to be in acute distress.    DDX: includes but is not limited to:  issues    Patient arrives medically stable with vitals interpreted by " myself.     Pertinent features from physical exam: Benign exam, cast in place.    Initial diagnostic plan: No testing needed    Diagnostic information from other sources: Medical record    Interventions / Re-evaluation: Patient did not require any medication during her visit.  She rested comfortably while placement nominations remaining excepted.    Results/clinical rationale were discussed with attending physician.  Patient does not meet criteria for admission, and based on the note from the orthopedic provider, the surgical repair would not happen for at least a week.  The patient would not able to stay in the hospital for that amount of time.  This was discussed with the patient.  She was ultimately pulled to stay with a friend.  She was provided a cab voucher in order to be transferred to her friend's house.  Advised to follow her orthopedic provider's recommendations until she is able to have the surgery.    Consultations/Discussion of results with other physicians: None    Disposition plan: Patient discharged in stable condition.  -----        Final diagnoses:   Left foot pain          Jose Wadsworth, APRN  09/09/23 1506

## 2023-09-07 NOTE — DISCHARGE INSTRUCTIONS
As rojas by the orthopedic provider you saw earlier, do not bear weight with your left foot.  Use the crutches to help with walking.  When sitting keep your foot elevated to help with swelling.  Follow-up with them as planned until you are able to have your surgery once the swelling has improved.

## 2023-09-07 NOTE — PROGRESS NOTES
Patient is a 53 yo female who is homeless. She was evaluated today in our outpatient clinic at [facility]. She sustained a left comminuted intra articular distal tibia fracture with anterior subluxation of the talus from an injury two days ago 9/5/23 after stepping into a hole. She states she was offered admission from the ED but she declined and left the ER with crutches and in a walking boot. She reports continued significant distal left leg ankle and foot pain. Denies LLE numbness or tingling.     Her images and social status was discussed with Dr. Josue Due to her extensive soft tissue swelling would plan to delay surgical intervention until the end of next week to allow edema to improve which would help aid wound healing post operatively.       Patient is homeless and does not have any help or assistance. Multiple homeless shelters were contacted but there was no availability for the patient. Patient was placed in a well padded short leg cast. She weill remain non weight bearing LLE. I am concerned about patient's limited assistance and resources and believe this may place her at increased risk for potential further injury/complications from her injury.

## 2023-09-26 ENCOUNTER — HOSPITAL ENCOUNTER (OUTPATIENT)
Facility: HOSPITAL | Age: 53
Setting detail: OBSERVATION
Discharge: HOME | End: 2023-09-27
Attending: EMERGENCY MEDICINE | Admitting: FAMILY MEDICINE
Payer: COMMERCIAL

## 2023-09-26 DIAGNOSIS — S82.892A CLOSED FRACTURE OF LEFT ANKLE, INITIAL ENCOUNTER: Primary | ICD-10-CM

## 2023-09-26 LAB
ALBUMIN SERPL-MCNC: 4.6 G/DL (ref 3.5–5.2)
ALBUMIN/GLOB SERPL: 1.4 G/DL
ALP SERPL-CCNC: 167 U/L (ref 39–117)
ALT SERPL W P-5'-P-CCNC: 31 U/L (ref 1–33)
ANION GAP SERPL CALCULATED.3IONS-SCNC: 13.2 MMOL/L (ref 5–15)
AST SERPL-CCNC: 25 U/L (ref 1–32)
BASOPHILS # BLD AUTO: 0.06 10*3/MM3 (ref 0–0.2)
BASOPHILS NFR BLD AUTO: 0.7 % (ref 0–1.5)
BILIRUB SERPL-MCNC: 0.2 MG/DL (ref 0–1.2)
BUN SERPL-MCNC: 12 MG/DL (ref 6–20)
BUN/CREAT SERPL: 16.9 (ref 7–25)
CALCIUM SPEC-SCNC: 10.1 MG/DL (ref 8.6–10.5)
CHLORIDE SERPL-SCNC: 102 MMOL/L (ref 98–107)
CO2 SERPL-SCNC: 24.8 MMOL/L (ref 22–29)
CREAT SERPL-MCNC: 0.71 MG/DL (ref 0.57–1)
DEPRECATED RDW RBC AUTO: 40.6 FL (ref 37–54)
EGFRCR SERPLBLD CKD-EPI 2021: 101.8 ML/MIN/1.73
EOSINOPHIL # BLD AUTO: 0.07 10*3/MM3 (ref 0–0.4)
EOSINOPHIL NFR BLD AUTO: 0.8 % (ref 0.3–6.2)
ERYTHROCYTE [DISTWIDTH] IN BLOOD BY AUTOMATED COUNT: 12.6 % (ref 12.3–15.4)
GLOBULIN UR ELPH-MCNC: 3.4 GM/DL
GLUCOSE SERPL-MCNC: 80 MG/DL (ref 65–99)
HCT VFR BLD AUTO: 44.8 % (ref 34–46.6)
HGB BLD-MCNC: 14.7 G/DL (ref 12–15.9)
HOLD SPECIMEN: NORMAL
HOLD SPECIMEN: NORMAL
IMM GRANULOCYTES # BLD AUTO: 0.02 10*3/MM3 (ref 0–0.05)
IMM GRANULOCYTES NFR BLD AUTO: 0.2 % (ref 0–0.5)
LYMPHOCYTES # BLD AUTO: 2.77 10*3/MM3 (ref 0.7–3.1)
LYMPHOCYTES NFR BLD AUTO: 32 % (ref 19.6–45.3)
MCH RBC QN AUTO: 28.8 PG (ref 26.6–33)
MCHC RBC AUTO-ENTMCNC: 32.8 G/DL (ref 31.5–35.7)
MCV RBC AUTO: 87.7 FL (ref 79–97)
MONOCYTES # BLD AUTO: 0.44 10*3/MM3 (ref 0.1–0.9)
MONOCYTES NFR BLD AUTO: 5.1 % (ref 5–12)
NEUTROPHILS NFR BLD AUTO: 5.29 10*3/MM3 (ref 1.7–7)
NEUTROPHILS NFR BLD AUTO: 61.2 % (ref 42.7–76)
NRBC BLD AUTO-RTO: 0 /100 WBC (ref 0–0.2)
PLATELET # BLD AUTO: 426 10*3/MM3 (ref 140–450)
PMV BLD AUTO: 8.8 FL (ref 6–12)
POTASSIUM SERPL-SCNC: 4.1 MMOL/L (ref 3.5–5.2)
PROT SERPL-MCNC: 8 G/DL (ref 6–8.5)
RBC # BLD AUTO: 5.11 10*6/MM3 (ref 3.77–5.28)
SODIUM SERPL-SCNC: 140 MMOL/L (ref 136–145)
WBC NRBC COR # BLD: 8.65 10*3/MM3 (ref 3.4–10.8)
WHOLE BLOOD HOLD COAG: NORMAL
WHOLE BLOOD HOLD SPECIMEN: NORMAL

## 2023-09-26 PROCEDURE — G0378 HOSPITAL OBSERVATION PER HR: HCPCS

## 2023-09-26 PROCEDURE — 99284 EMERGENCY DEPT VISIT MOD MDM: CPT

## 2023-09-26 PROCEDURE — 25010000002 KETOROLAC TROMETHAMINE PER 15 MG

## 2023-09-26 PROCEDURE — 96374 THER/PROPH/DIAG INJ IV PUSH: CPT

## 2023-09-26 PROCEDURE — 85025 COMPLETE CBC W/AUTO DIFF WBC: CPT

## 2023-09-26 PROCEDURE — 80053 COMPREHEN METABOLIC PANEL: CPT

## 2023-09-26 PROCEDURE — 85610 PROTHROMBIN TIME: CPT | Performed by: FAMILY MEDICINE

## 2023-09-26 PROCEDURE — 96361 HYDRATE IV INFUSION ADD-ON: CPT

## 2023-09-26 RX ORDER — NICOTINE 21 MG/24HR
1 PATCH, TRANSDERMAL 24 HOURS TRANSDERMAL
Status: DISCONTINUED | OUTPATIENT
Start: 2023-09-27 | End: 2023-09-27 | Stop reason: HOSPADM

## 2023-09-26 RX ORDER — SODIUM CHLORIDE 9 MG/ML
100 INJECTION, SOLUTION INTRAVENOUS CONTINUOUS
Status: DISCONTINUED | OUTPATIENT
Start: 2023-09-26 | End: 2023-09-27

## 2023-09-26 RX ORDER — SODIUM CHLORIDE 0.9 % (FLUSH) 0.9 %
10 SYRINGE (ML) INJECTION AS NEEDED
Status: DISCONTINUED | OUTPATIENT
Start: 2023-09-26 | End: 2023-09-27 | Stop reason: HOSPADM

## 2023-09-26 RX ORDER — KETOROLAC TROMETHAMINE 30 MG/ML
15 INJECTION, SOLUTION INTRAMUSCULAR; INTRAVENOUS ONCE
Status: COMPLETED | OUTPATIENT
Start: 2023-09-26 | End: 2023-09-26

## 2023-09-26 RX ADMIN — KETOROLAC TROMETHAMINE 15 MG: 30 INJECTION, SOLUTION INTRAMUSCULAR; INTRAVENOUS at 23:52

## 2023-09-26 RX ADMIN — SODIUM CHLORIDE 100 ML/HR: 9 INJECTION, SOLUTION INTRAVENOUS at 23:51

## 2023-09-27 ENCOUNTER — ANESTHESIA EVENT CONVERTED (OUTPATIENT)
Dept: ANESTHESIOLOGY | Facility: HOSPITAL | Age: 53
End: 2023-09-27
Payer: COMMERCIAL

## 2023-09-27 ENCOUNTER — ANESTHESIA (OUTPATIENT)
Dept: PERIOP | Facility: HOSPITAL | Age: 53
End: 2023-09-27
Payer: COMMERCIAL

## 2023-09-27 ENCOUNTER — ANESTHESIA EVENT (OUTPATIENT)
Dept: PERIOP | Facility: HOSPITAL | Age: 53
End: 2023-09-27
Payer: COMMERCIAL

## 2023-09-27 ENCOUNTER — APPOINTMENT (OUTPATIENT)
Dept: GENERAL RADIOLOGY | Facility: HOSPITAL | Age: 53
End: 2023-09-27
Payer: COMMERCIAL

## 2023-09-27 VITALS
RESPIRATION RATE: 16 BRPM | WEIGHT: 150 LBS | SYSTOLIC BLOOD PRESSURE: 103 MMHG | HEART RATE: 74 BPM | BODY MASS INDEX: 24.11 KG/M2 | TEMPERATURE: 97.4 F | DIASTOLIC BLOOD PRESSURE: 66 MMHG | HEIGHT: 66 IN | OXYGEN SATURATION: 98 %

## 2023-09-27 PROBLEM — S82.899A ANKLE FRACTURE: Status: ACTIVE | Noted: 2023-09-27

## 2023-09-27 LAB
AMPHET+METHAMPHET UR QL: POSITIVE
AMPHETAMINES UR QL: POSITIVE
BACTERIA UR QL AUTO: ABNORMAL /HPF
BARBITURATES UR QL SCN: NEGATIVE
BENZODIAZ UR QL SCN: NEGATIVE
BILIRUB UR QL STRIP: NEGATIVE
BUPRENORPHINE SERPL-MCNC: NEGATIVE NG/ML
CANNABINOIDS SERPL QL: POSITIVE
CLARITY UR: CLEAR
COCAINE UR QL: NEGATIVE
COLOR UR: YELLOW
FENTANYL UR-MCNC: NEGATIVE NG/ML
GLUCOSE BLDC GLUCOMTR-MCNC: 100 MG/DL (ref 70–130)
GLUCOSE UR STRIP-MCNC: NEGATIVE MG/DL
HGB UR QL STRIP.AUTO: NEGATIVE
HYALINE CASTS UR QL AUTO: ABNORMAL /LPF
INR PPP: 0.89 (ref 0.9–1.1)
KETONES UR QL STRIP: ABNORMAL
LEUKOCYTE ESTERASE UR QL STRIP.AUTO: ABNORMAL
METHADONE UR QL SCN: NEGATIVE
NITRITE UR QL STRIP: POSITIVE
OPIATES UR QL: NEGATIVE
OXYCODONE UR QL SCN: NEGATIVE
PCP UR QL SCN: NEGATIVE
PH UR STRIP.AUTO: 5.5 [PH] (ref 5–8)
PROPOXYPH UR QL: NEGATIVE
PROT UR QL STRIP: NEGATIVE
PROTHROMBIN TIME: 12.5 SECONDS (ref 12.3–15.1)
RBC # UR STRIP: ABNORMAL /HPF
REF LAB TEST METHOD: ABNORMAL
SP GR UR STRIP: 1.02 (ref 1–1.03)
SQUAMOUS #/AREA URNS HPF: ABNORMAL /HPF
TRICYCLICS UR QL SCN: NEGATIVE
UROBILINOGEN UR QL STRIP: ABNORMAL
WBC # UR STRIP: ABNORMAL /HPF

## 2023-09-27 PROCEDURE — 25010000002 ENOXAPARIN PER 10 MG: Performed by: FAMILY MEDICINE

## 2023-09-27 PROCEDURE — 25010000002 METOCLOPRAMIDE PER 10 MG: Performed by: NURSE ANESTHETIST, CERTIFIED REGISTERED

## 2023-09-27 PROCEDURE — 87088 URINE BACTERIA CULTURE: CPT | Performed by: FAMILY MEDICINE

## 2023-09-27 PROCEDURE — 25010000002 FENTANYL CITRATE (PF) 100 MCG/2ML SOLUTION: Performed by: NURSE ANESTHETIST, CERTIFIED REGISTERED

## 2023-09-27 PROCEDURE — G0378 HOSPITAL OBSERVATION PER HR: HCPCS

## 2023-09-27 PROCEDURE — 80307 DRUG TEST PRSMV CHEM ANLYZR: CPT | Performed by: FAMILY MEDICINE

## 2023-09-27 PROCEDURE — 87186 SC STD MICRODIL/AGAR DIL: CPT | Performed by: FAMILY MEDICINE

## 2023-09-27 PROCEDURE — 25010000002 ONDANSETRON PER 1 MG: Performed by: NURSE ANESTHETIST, CERTIFIED REGISTERED

## 2023-09-27 PROCEDURE — 96372 THER/PROPH/DIAG INJ SC/IM: CPT

## 2023-09-27 PROCEDURE — 81001 URINALYSIS AUTO W/SCOPE: CPT | Performed by: FAMILY MEDICINE

## 2023-09-27 PROCEDURE — C1713 ANCHOR/SCREW BN/BN,TIS/BN: HCPCS | Performed by: ORTHOPAEDIC SURGERY

## 2023-09-27 PROCEDURE — 25010000002 BUPIVACAINE (PF) 0.25 % SOLUTION: Performed by: NURSE ANESTHETIST, CERTIFIED REGISTERED

## 2023-09-27 PROCEDURE — 25010000002 HYDROMORPHONE 1 MG/ML SOLUTION: Performed by: NURSE ANESTHETIST, CERTIFIED REGISTERED

## 2023-09-27 PROCEDURE — 87086 URINE CULTURE/COLONY COUNT: CPT | Performed by: FAMILY MEDICINE

## 2023-09-27 PROCEDURE — 25010000002 DEXAMETHASONE PER 1 MG: Performed by: NURSE ANESTHETIST, CERTIFIED REGISTERED

## 2023-09-27 PROCEDURE — 96361 HYDRATE IV INFUSION ADD-ON: CPT

## 2023-09-27 PROCEDURE — 94640 AIRWAY INHALATION TREATMENT: CPT

## 2023-09-27 PROCEDURE — 71045 X-RAY EXAM CHEST 1 VIEW: CPT

## 2023-09-27 PROCEDURE — 25010000002 MORPHINE PER 10 MG: Performed by: ORTHOPAEDIC SURGERY

## 2023-09-27 PROCEDURE — 73610 X-RAY EXAM OF ANKLE: CPT

## 2023-09-27 PROCEDURE — 0 CEFAZOLIN SODIUM-DEXTROSE 2-3 GM-%(50ML) RECONSTITUTED SOLUTION: Performed by: ORTHOPAEDIC SURGERY

## 2023-09-27 PROCEDURE — 96375 TX/PRO/DX INJ NEW DRUG ADDON: CPT

## 2023-09-27 PROCEDURE — 82948 REAGENT STRIP/BLOOD GLUCOSE: CPT

## 2023-09-27 PROCEDURE — 25010000002 MIDAZOLAM PER 1MG: Performed by: NURSE ANESTHETIST, CERTIFIED REGISTERED

## 2023-09-27 PROCEDURE — 76000 FLUOROSCOPY <1 HR PHYS/QHP: CPT

## 2023-09-27 PROCEDURE — 25010000002 BUPIVACAINE (PF) 0.5 % SOLUTION: Performed by: NURSE ANESTHETIST, CERTIFIED REGISTERED

## 2023-09-27 PROCEDURE — 25010000002 FENTANYL CITRATE (PF) 50 MCG/ML SOLUTION: Performed by: EMERGENCY MEDICINE

## 2023-09-27 PROCEDURE — 25010000002 ONDANSETRON PER 1 MG: Performed by: EMERGENCY MEDICINE

## 2023-09-27 PROCEDURE — 99235 HOSP IP/OBS SAME DATE MOD 70: CPT | Performed by: FAMILY MEDICINE

## 2023-09-27 PROCEDURE — 25010000002 DEXAMETHASONE SODIUM PHOSPHATE 10 MG/ML SOLUTION: Performed by: NURSE ANESTHETIST, CERTIFIED REGISTERED

## 2023-09-27 PROCEDURE — 25010000002 PROPOFOL 200 MG/20ML EMULSION: Performed by: NURSE ANESTHETIST, CERTIFIED REGISTERED

## 2023-09-27 DEVICE — 3.5 X 26 MM R3CON LOCKING PLATE SCREW
Type: IMPLANTABLE DEVICE | Site: ANKLE | Status: FUNCTIONAL
Brand: GORILLA PLATING SYSTEM

## 2023-09-27 DEVICE — 3.5 X 26 MM R3CON NON-LOCKING PLATE SCREW
Type: IMPLANTABLE DEVICE | Site: ANKLE | Status: FUNCTIONAL
Brand: GORILLA PLATING SYSTEM

## 2023-09-27 DEVICE — 3.5 X 44 MM R3CON LOCKING PLATE SCREW
Type: IMPLANTABLE DEVICE | Site: ANKLE | Status: FUNCTIONAL
Brand: GORILLA PLATING SYSTEM

## 2023-09-27 DEVICE — GORILLA, ANKLE FX, POSTEROLATERAL TIBIA PLATE, 08-HOLE, RIGHT
Type: IMPLANTABLE DEVICE | Site: ANKLE | Status: FUNCTIONAL
Brand: BABY GORILLA/GORILLA PLATING SYSTEM

## 2023-09-27 DEVICE — 3.5 X 44 MM R3CON NON-LOCKING PLATE SCREW
Type: IMPLANTABLE DEVICE | Site: ANKLE | Status: FUNCTIONAL
Brand: GORILLA PLATING SYSTEM

## 2023-09-27 DEVICE — DEV CONTRL TISS STRATAFIX SPIRAL MNCRYL UD 3/0 PLS 60CM: Type: IMPLANTABLE DEVICE | Site: ANKLE | Status: FUNCTIONAL

## 2023-09-27 RX ORDER — ENOXAPARIN SODIUM 100 MG/ML
40 INJECTION SUBCUTANEOUS EVERY 24 HOURS
Status: DISCONTINUED | OUTPATIENT
Start: 2023-09-27 | End: 2023-09-27 | Stop reason: SDUPTHER

## 2023-09-27 RX ORDER — DEXAMETHASONE SODIUM PHOSPHATE 10 MG/ML
INJECTION, SOLUTION INTRAMUSCULAR; INTRAVENOUS AS NEEDED
Status: DISCONTINUED | OUTPATIENT
Start: 2023-09-27 | End: 2023-09-27 | Stop reason: SURG

## 2023-09-27 RX ORDER — ONDANSETRON 4 MG/1
4 TABLET, FILM COATED ORAL EVERY 6 HOURS PRN
Status: DISCONTINUED | OUTPATIENT
Start: 2023-09-27 | End: 2023-09-27 | Stop reason: HOSPADM

## 2023-09-27 RX ORDER — SODIUM CHLORIDE, SODIUM LACTATE, POTASSIUM CHLORIDE, CALCIUM CHLORIDE 600; 310; 30; 20 MG/100ML; MG/100ML; MG/100ML; MG/100ML
INJECTION, SOLUTION INTRAVENOUS CONTINUOUS PRN
Status: DISCONTINUED | OUTPATIENT
Start: 2023-09-27 | End: 2023-09-27 | Stop reason: SURG

## 2023-09-27 RX ORDER — IPRATROPIUM BROMIDE AND ALBUTEROL SULFATE 2.5; .5 MG/3ML; MG/3ML
3 SOLUTION RESPIRATORY (INHALATION) ONCE
Status: DISCONTINUED | OUTPATIENT
Start: 2023-09-27 | End: 2023-09-27 | Stop reason: HOSPADM

## 2023-09-27 RX ORDER — BISACODYL 5 MG/1
5 TABLET, DELAYED RELEASE ORAL DAILY PRN
Status: DISCONTINUED | OUTPATIENT
Start: 2023-09-27 | End: 2023-09-27 | Stop reason: HOSPADM

## 2023-09-27 RX ORDER — POLYETHYLENE GLYCOL 3350 17 G/17G
17 POWDER, FOR SOLUTION ORAL DAILY PRN
Status: DISCONTINUED | OUTPATIENT
Start: 2023-09-27 | End: 2023-09-27 | Stop reason: HOSPADM

## 2023-09-27 RX ORDER — FENTANYL CITRATE 50 UG/ML
INJECTION, SOLUTION INTRAMUSCULAR; INTRAVENOUS AS NEEDED
Status: DISCONTINUED | OUTPATIENT
Start: 2023-09-27 | End: 2023-09-27 | Stop reason: SURG

## 2023-09-27 RX ORDER — LIDOCAINE HYDROCHLORIDE 20 MG/ML
INJECTION, SOLUTION INTRAVENOUS AS NEEDED
Status: DISCONTINUED | OUTPATIENT
Start: 2023-09-27 | End: 2023-09-27 | Stop reason: SURG

## 2023-09-27 RX ORDER — ONDANSETRON 2 MG/ML
4 INJECTION INTRAMUSCULAR; INTRAVENOUS ONCE
Status: DISCONTINUED | OUTPATIENT
Start: 2023-09-27 | End: 2023-09-27 | Stop reason: HOSPADM

## 2023-09-27 RX ORDER — KETAMINE HCL IN NACL, ISO-OSM 100MG/10ML
SYRINGE (ML) INJECTION AS NEEDED
Status: DISCONTINUED | OUTPATIENT
Start: 2023-09-27 | End: 2023-09-27 | Stop reason: SURG

## 2023-09-27 RX ORDER — BUPIVACAINE HYDROCHLORIDE 2.5 MG/ML
INJECTION, SOLUTION EPIDURAL; INFILTRATION; INTRACAUDAL AS NEEDED
Status: DISCONTINUED | OUTPATIENT
Start: 2023-09-27 | End: 2023-09-27 | Stop reason: SURG

## 2023-09-27 RX ORDER — ONDANSETRON 2 MG/ML
4 INJECTION INTRAMUSCULAR; INTRAVENOUS ONCE
Status: COMPLETED | OUTPATIENT
Start: 2023-09-27 | End: 2023-09-27

## 2023-09-27 RX ORDER — CHOLECALCIFEROL (VITAMIN D3) 125 MCG
5 CAPSULE ORAL NIGHTLY PRN
Status: DISCONTINUED | OUTPATIENT
Start: 2023-09-27 | End: 2023-09-27 | Stop reason: HOSPADM

## 2023-09-27 RX ORDER — ONDANSETRON 2 MG/ML
INJECTION INTRAMUSCULAR; INTRAVENOUS AS NEEDED
Status: DISCONTINUED | OUTPATIENT
Start: 2023-09-27 | End: 2023-09-27 | Stop reason: SURG

## 2023-09-27 RX ORDER — ENOXAPARIN SODIUM 100 MG/ML
40 INJECTION SUBCUTANEOUS DAILY
Status: DISCONTINUED | OUTPATIENT
Start: 2023-09-28 | End: 2023-09-27 | Stop reason: HOSPADM

## 2023-09-27 RX ORDER — FAMOTIDINE 10 MG/ML
INJECTION, SOLUTION INTRAVENOUS AS NEEDED
Status: DISCONTINUED | OUTPATIENT
Start: 2023-09-27 | End: 2023-09-27 | Stop reason: SURG

## 2023-09-27 RX ORDER — HYDROCODONE BITARTRATE AND ACETAMINOPHEN 5; 325 MG/1; MG/1
1 TABLET ORAL EVERY 4 HOURS PRN
Status: DISCONTINUED | OUTPATIENT
Start: 2023-09-27 | End: 2023-09-27 | Stop reason: HOSPADM

## 2023-09-27 RX ORDER — ONDANSETRON 2 MG/ML
4 INJECTION INTRAMUSCULAR; INTRAVENOUS EVERY 6 HOURS PRN
Status: DISCONTINUED | OUTPATIENT
Start: 2023-09-27 | End: 2023-09-27 | Stop reason: HOSPADM

## 2023-09-27 RX ORDER — BUPIVACAINE HCL/0.9 % NACL/PF 0.125 %
PLASTIC BAG, INJECTION (ML) EPIDURAL AS NEEDED
Status: DISCONTINUED | OUTPATIENT
Start: 2023-09-27 | End: 2023-09-27 | Stop reason: SURG

## 2023-09-27 RX ORDER — SODIUM CHLORIDE 0.9 % (FLUSH) 0.9 %
10 SYRINGE (ML) INJECTION EVERY 12 HOURS SCHEDULED
Status: DISCONTINUED | OUTPATIENT
Start: 2023-09-27 | End: 2023-09-27 | Stop reason: HOSPADM

## 2023-09-27 RX ORDER — MORPHINE SULFATE 2 MG/ML
2 INJECTION, SOLUTION INTRAMUSCULAR; INTRAVENOUS EVERY 4 HOURS PRN
Status: DISCONTINUED | OUTPATIENT
Start: 2023-09-27 | End: 2023-09-27 | Stop reason: HOSPADM

## 2023-09-27 RX ORDER — LORAZEPAM 2 MG/ML
1 INJECTION INTRAMUSCULAR ONCE
Status: DISCONTINUED | OUTPATIENT
Start: 2023-09-27 | End: 2023-09-27 | Stop reason: HOSPADM

## 2023-09-27 RX ORDER — MORPHINE SULFATE 2 MG/ML
2 INJECTION, SOLUTION INTRAMUSCULAR; INTRAVENOUS EVERY 4 HOURS PRN
Status: DISCONTINUED | OUTPATIENT
Start: 2023-09-27 | End: 2023-09-27 | Stop reason: SDUPTHER

## 2023-09-27 RX ORDER — OXYCODONE AND ACETAMINOPHEN 10; 325 MG/1; MG/1
1 TABLET ORAL EVERY 4 HOURS PRN
Status: DISCONTINUED | OUTPATIENT
Start: 2023-09-27 | End: 2023-09-27 | Stop reason: HOSPADM

## 2023-09-27 RX ORDER — ONDANSETRON 2 MG/ML
4 INJECTION INTRAMUSCULAR; INTRAVENOUS EVERY 6 HOURS PRN
Status: DISCONTINUED | OUTPATIENT
Start: 2023-09-27 | End: 2023-09-27 | Stop reason: SDUPTHER

## 2023-09-27 RX ORDER — FENTANYL CITRATE 50 UG/ML
25 INJECTION, SOLUTION INTRAMUSCULAR; INTRAVENOUS ONCE
Status: COMPLETED | OUTPATIENT
Start: 2023-09-27 | End: 2023-09-27

## 2023-09-27 RX ORDER — BISACODYL 10 MG
10 SUPPOSITORY, RECTAL RECTAL DAILY PRN
Status: DISCONTINUED | OUTPATIENT
Start: 2023-09-27 | End: 2023-09-27 | Stop reason: HOSPADM

## 2023-09-27 RX ORDER — SENNOSIDES 8.6 MG
650 CAPSULE ORAL EVERY 8 HOURS PRN
COMMUNITY

## 2023-09-27 RX ORDER — DEXAMETHASONE SODIUM PHOSPHATE 4 MG/ML
INJECTION, SOLUTION INTRA-ARTICULAR; INTRALESIONAL; INTRAMUSCULAR; INTRAVENOUS; SOFT TISSUE AS NEEDED
Status: DISCONTINUED | OUTPATIENT
Start: 2023-09-27 | End: 2023-09-27 | Stop reason: SURG

## 2023-09-27 RX ORDER — SODIUM CHLORIDE 0.9 % (FLUSH) 0.9 %
10 SYRINGE (ML) INJECTION AS NEEDED
Status: DISCONTINUED | OUTPATIENT
Start: 2023-09-27 | End: 2023-09-27 | Stop reason: HOSPADM

## 2023-09-27 RX ORDER — CEFAZOLIN SODIUM 2 G/50ML
2000 SOLUTION INTRAVENOUS EVERY 8 HOURS
Status: DISCONTINUED | OUTPATIENT
Start: 2023-09-27 | End: 2023-09-27 | Stop reason: HOSPADM

## 2023-09-27 RX ORDER — PROPOFOL 10 MG/ML
INJECTION, EMULSION INTRAVENOUS AS NEEDED
Status: DISCONTINUED | OUTPATIENT
Start: 2023-09-27 | End: 2023-09-27 | Stop reason: SURG

## 2023-09-27 RX ORDER — ACETAMINOPHEN 325 MG/1
650 TABLET ORAL EVERY 4 HOURS PRN
Status: DISCONTINUED | OUTPATIENT
Start: 2023-09-27 | End: 2023-09-27 | Stop reason: HOSPADM

## 2023-09-27 RX ORDER — OXYCODONE HYDROCHLORIDE AND ACETAMINOPHEN 5; 325 MG/1; MG/1
1 TABLET ORAL EVERY 4 HOURS PRN
Status: DISCONTINUED | OUTPATIENT
Start: 2023-09-27 | End: 2023-09-27 | Stop reason: HOSPADM

## 2023-09-27 RX ORDER — METOCLOPRAMIDE HYDROCHLORIDE 5 MG/ML
INJECTION INTRAMUSCULAR; INTRAVENOUS AS NEEDED
Status: DISCONTINUED | OUTPATIENT
Start: 2023-09-27 | End: 2023-09-27 | Stop reason: SURG

## 2023-09-27 RX ORDER — CEFAZOLIN SODIUM 2 G/50ML
2 SOLUTION INTRAVENOUS ONCE
Status: COMPLETED | OUTPATIENT
Start: 2023-09-27 | End: 2023-09-27

## 2023-09-27 RX ORDER — SODIUM CHLORIDE 9 MG/ML
40 INJECTION, SOLUTION INTRAVENOUS AS NEEDED
Status: DISCONTINUED | OUTPATIENT
Start: 2023-09-27 | End: 2023-09-27 | Stop reason: HOSPADM

## 2023-09-27 RX ORDER — CLINDAMYCIN PHOSPHATE 900 MG/50ML
900 INJECTION INTRAVENOUS ONCE
Status: CANCELLED | OUTPATIENT
Start: 2023-09-27 | End: 2023-09-27

## 2023-09-27 RX ORDER — ALUMINA, MAGNESIA, AND SIMETHICONE 2400; 2400; 240 MG/30ML; MG/30ML; MG/30ML
15 SUSPENSION ORAL EVERY 6 HOURS PRN
Status: DISCONTINUED | OUTPATIENT
Start: 2023-09-27 | End: 2023-09-27 | Stop reason: HOSPADM

## 2023-09-27 RX ORDER — IPRATROPIUM BROMIDE AND ALBUTEROL SULFATE 2.5; .5 MG/3ML; MG/3ML
3 SOLUTION RESPIRATORY (INHALATION) ONCE
Status: COMPLETED | OUTPATIENT
Start: 2023-09-27 | End: 2023-09-27

## 2023-09-27 RX ORDER — MIDAZOLAM HYDROCHLORIDE 2 MG/2ML
INJECTION, SOLUTION INTRAMUSCULAR; INTRAVENOUS AS NEEDED
Status: DISCONTINUED | OUTPATIENT
Start: 2023-09-27 | End: 2023-09-27 | Stop reason: SURG

## 2023-09-27 RX ORDER — AMOXICILLIN 250 MG
2 CAPSULE ORAL 2 TIMES DAILY
Status: DISCONTINUED | OUTPATIENT
Start: 2023-09-27 | End: 2023-09-27 | Stop reason: HOSPADM

## 2023-09-27 RX ORDER — SODIUM CHLORIDE 9 MG/ML
100 INJECTION, SOLUTION INTRAVENOUS CONTINUOUS
Status: DISCONTINUED | OUTPATIENT
Start: 2023-09-27 | End: 2023-09-27 | Stop reason: HOSPADM

## 2023-09-27 RX ORDER — LIDOCAINE HYDROCHLORIDE 20 MG/ML
INJECTION, SOLUTION INFILTRATION; PERINEURAL AS NEEDED
Status: DISCONTINUED | OUTPATIENT
Start: 2023-09-27 | End: 2023-09-27 | Stop reason: SURG

## 2023-09-27 RX ORDER — NALOXONE HCL 0.4 MG/ML
0.4 VIAL (ML) INJECTION
Status: DISCONTINUED | OUTPATIENT
Start: 2023-09-27 | End: 2023-09-27 | Stop reason: HOSPADM

## 2023-09-27 RX ORDER — KETOROLAC TROMETHAMINE 30 MG/ML
15 INJECTION, SOLUTION INTRAMUSCULAR; INTRAVENOUS EVERY 6 HOURS PRN
Status: DISCONTINUED | OUTPATIENT
Start: 2023-09-27 | End: 2023-09-27 | Stop reason: HOSPADM

## 2023-09-27 RX ORDER — BUPIVACAINE HYDROCHLORIDE 5 MG/ML
INJECTION, SOLUTION EPIDURAL; INTRACAUDAL AS NEEDED
Status: DISCONTINUED | OUTPATIENT
Start: 2023-09-27 | End: 2023-09-27 | Stop reason: SURG

## 2023-09-27 RX ADMIN — Medication 200 MCG: at 11:49

## 2023-09-27 RX ADMIN — HYDROMORPHONE HYDROCHLORIDE 0.5 MG: 1 INJECTION, SOLUTION INTRAMUSCULAR; INTRAVENOUS; SUBCUTANEOUS at 13:27

## 2023-09-27 RX ADMIN — SODIUM CHLORIDE 100 ML/HR: 9 INJECTION, SOLUTION INTRAVENOUS at 04:56

## 2023-09-27 RX ADMIN — CEFAZOLIN SODIUM 2 G: 2 SOLUTION INTRAVENOUS at 11:28

## 2023-09-27 RX ADMIN — LIDOCAINE HYDROCHLORIDE 40 MG: 20 INJECTION, SOLUTION INTRAVENOUS at 11:43

## 2023-09-27 RX ADMIN — HYDROCODONE BITARTRATE AND ACETAMINOPHEN 1 TABLET: 5; 325 TABLET ORAL at 04:37

## 2023-09-27 RX ADMIN — PROPOFOL 50 MG: 10 INJECTION, EMULSION INTRAVENOUS at 11:43

## 2023-09-27 RX ADMIN — DEXAMETHASONE SODIUM PHOSPHATE 10 MG: 10 INJECTION, SOLUTION INTRAMUSCULAR; INTRAVENOUS at 10:25

## 2023-09-27 RX ADMIN — FENTANYL CITRATE 25 MCG: 50 INJECTION, SOLUTION INTRAMUSCULAR; INTRAVENOUS at 01:46

## 2023-09-27 RX ADMIN — METOCLOPRAMIDE 5 MG: 5 INJECTION, SOLUTION INTRAMUSCULAR; INTRAVENOUS at 10:17

## 2023-09-27 RX ADMIN — Medication 10 ML: at 09:21

## 2023-09-27 RX ADMIN — Medication 20 MG: at 10:23

## 2023-09-27 RX ADMIN — ONDANSETRON 4 MG: 2 INJECTION INTRAMUSCULAR; INTRAVENOUS at 01:46

## 2023-09-27 RX ADMIN — LIDOCAINE HYDROCHLORIDE 10 MG: 20 INJECTION, SOLUTION INFILTRATION; PERINEURAL at 10:25

## 2023-09-27 RX ADMIN — HYDROMORPHONE HYDROCHLORIDE 0.5 MG: 1 INJECTION, SOLUTION INTRAMUSCULAR; INTRAVENOUS; SUBCUTANEOUS at 13:53

## 2023-09-27 RX ADMIN — ONDANSETRON 4 MG: 2 INJECTION INTRAMUSCULAR; INTRAVENOUS at 11:54

## 2023-09-27 RX ADMIN — Medication 1 PATCH: at 00:20

## 2023-09-27 RX ADMIN — OXYCODONE HYDROCHLORIDE AND ACETAMINOPHEN 1 TABLET: 10; 325 TABLET ORAL at 17:17

## 2023-09-27 RX ADMIN — BUPIVACAINE HYDROCHLORIDE 10 MG: 5 INJECTION, SOLUTION EPIDURAL; INTRACAUDAL; PERINEURAL at 10:25

## 2023-09-27 RX ADMIN — FENTANYL CITRATE 100 MCG: 50 INJECTION INTRAMUSCULAR; INTRAVENOUS at 10:23

## 2023-09-27 RX ADMIN — FAMOTIDINE 20 MG: 10 INJECTION, SOLUTION INTRAVENOUS at 10:17

## 2023-09-27 RX ADMIN — MIDAZOLAM HYDROCHLORIDE 2 MG: 1 INJECTION, SOLUTION INTRAMUSCULAR; INTRAVENOUS at 10:23

## 2023-09-27 RX ADMIN — ENOXAPARIN SODIUM 40 MG: 100 INJECTION SUBCUTANEOUS at 04:37

## 2023-09-27 RX ADMIN — Medication 30 MG: at 11:39

## 2023-09-27 RX ADMIN — Medication 200 MCG: at 11:58

## 2023-09-27 RX ADMIN — IPRATROPIUM BROMIDE AND ALBUTEROL SULFATE 3 ML: .5; 3 SOLUTION RESPIRATORY (INHALATION) at 10:10

## 2023-09-27 RX ADMIN — HYDROMORPHONE HYDROCHLORIDE 0.5 MG: 1 INJECTION, SOLUTION INTRAMUSCULAR; INTRAVENOUS; SUBCUTANEOUS at 13:06

## 2023-09-27 RX ADMIN — BUPIVACAINE HYDROCHLORIDE 10 MG: 2.5 INJECTION, SOLUTION EPIDURAL; INFILTRATION; INTRACAUDAL; PERINEURAL at 10:27

## 2023-09-27 RX ADMIN — DEXAMETHASONE SODIUM PHOSPHATE 8 MG: 4 INJECTION, SOLUTION INTRA-ARTICULAR; INTRALESIONAL; INTRAMUSCULAR; INTRAVENOUS; SOFT TISSUE at 11:54

## 2023-09-27 RX ADMIN — SODIUM CHLORIDE 100 ML/HR: 9 INJECTION, SOLUTION INTRAVENOUS at 17:18

## 2023-09-27 RX ADMIN — GLYCOPYRROLATE 0.2 MCG: 0.2 INJECTION, SOLUTION INTRAMUSCULAR; INTRAVITREAL at 11:50

## 2023-09-27 RX ADMIN — PROPOFOL 150 MG: 10 INJECTION, EMULSION INTRAVENOUS at 11:42

## 2023-09-27 RX ADMIN — SODIUM CHLORIDE 100 ML/HR: 9 INJECTION, SOLUTION INTRAVENOUS at 09:21

## 2023-09-27 RX ADMIN — MORPHINE SULFATE 2 MG: 2 INJECTION, SOLUTION INTRAMUSCULAR; INTRAVENOUS at 09:21

## 2023-09-27 RX ADMIN — SODIUM CHLORIDE, POTASSIUM CHLORIDE, SODIUM LACTATE AND CALCIUM CHLORIDE 1000 ML: 600; 310; 30; 20 INJECTION, SOLUTION INTRAVENOUS at 10:03

## 2023-09-27 NOTE — ED PROVIDER NOTES
"Subjective:  History of Present Illness:    Patient is a 52-year-old female here today for left ankle/leg pain.  She has a fracture that has been evaluated by Dr. Josue and his staff.  She has been in a cast since earlier this month.  There has been transportation issues with her being able to come to the hospital for surgery.  She was notified by the office today to report to the hospital to have surgery tomorrow.      Nurses Notes reviewed and agree, including vitals, allergies, social history and prior medical history.     REVIEW OF SYSTEMS: All systems reviewed and not pertinent unless noted.  Review of Systems    Past Medical History:   Diagnosis Date    Acid reflux     Anemia     Asthma     COPD (chronic obstructive pulmonary disease)     Fibromyalgia, primary     Fractures     Low back pain     Neck pain     Ovarian cyst     Shoulder pain        Allergies:    Baclofen and Morphine and related      Past Surgical History:   Procedure Laterality Date    CHOLECYSTECTOMY      COSMETIC SURGERY      HYSTERECTOMY      MOUTH SURGERY      TUBAL ABDOMINAL LIGATION           Social History     Socioeconomic History    Marital status:    Tobacco Use    Smoking status: Every Day     Packs/day: 2.00     Types: Cigarettes   Substance and Sexual Activity    Alcohol use: No    Drug use: No    Sexual activity: Defer         Family History   Problem Relation Age of Onset    Cancer Mother     Diabetes Mother     Heart disease Mother     Cancer Father     Diabetes Father     Heart disease Father     Arthritis Father     Asthma Father        Objective  Physical Exam:  /73   Pulse 86   Temp 98 °F (36.7 °C) (Oral)   Resp 16   Ht 167.6 cm (66\")   Wt 68 kg (150 lb)   SpO2 98%   BMI 24.21 kg/m²      Physical Exam  Vitals and nursing note reviewed.   Constitutional:       Appearance: Normal appearance. She is normal weight.   HENT:      Head: Normocephalic and atraumatic.   Cardiovascular:      Rate and Rhythm: Normal " rate and regular rhythm.      Pulses: Normal pulses.      Heart sounds: Normal heart sounds.   Pulmonary:      Effort: Pulmonary effort is normal.      Breath sounds: Normal breath sounds.   Abdominal:      General: Abdomen is flat. Bowel sounds are normal. There is no distension.      Palpations: Abdomen is soft.      Tenderness: There is no abdominal tenderness.   Musculoskeletal:      Right lower leg: No edema.      Left lower leg: No edema.      Comments: Cast in place on left lower extremity from the toes to the proximal tibia.   Skin:     General: Skin is warm and dry.      Capillary Refill: Capillary refill takes less than 2 seconds.   Neurological:      General: No focal deficit present.      Mental Status: She is alert and oriented to person, place, and time.   Psychiatric:         Mood and Affect: Mood normal.         Behavior: Behavior normal.       Procedures    ED Course:         Lab Results (last 24 hours)       Procedure Component Value Units Date/Time    CBC & Differential [[id number]]  (Normal) Collected: 09/26/23 2056    Specimen: Blood Updated: 09/26/23 2104    Narrative:      The following orders were created for panel order CBC & Differential.  Procedure                               Abnormality         Status                     ---------                               -----------         ------                     CBC Auto Differential[[id number]]        Normal              Final result                 Please view results for these tests on the individual orders.    Comprehensive Metabolic Panel [[id number]]  (Abnormal) Collected: 09/26/23 2056    Specimen: Blood Updated: 09/26/23 2120     Glucose 80 mg/dL      BUN 12 mg/dL      Creatinine 0.71 mg/dL      Sodium 140 mmol/L      Potassium 4.1 mmol/L      Chloride 102 mmol/L      CO2 24.8 mmol/L      Calcium 10.1 mg/dL      Total Protein 8.0 g/dL      Albumin 4.6 g/dL      ALT (SGPT) 31 U/L      AST (SGOT) 25 U/L      Alkaline Phosphatase 167 U/L       Total Bilirubin 0.2 mg/dL      Globulin 3.4 gm/dL      A/G Ratio 1.4 g/dL      BUN/Creatinine Ratio 16.9     Anion Gap 13.2 mmol/L      eGFR 101.8 mL/min/1.73     Narrative:      GFR Normal >60  Chronic Kidney Disease <60  Kidney Failure <15      CBC Auto Differential [[id number]]  (Normal) Collected: 09/26/23 2056    Specimen: Blood Updated: 09/26/23 2104     WBC 8.65 10*3/mm3      RBC 5.11 10*6/mm3      Hemoglobin 14.7 g/dL      Hematocrit 44.8 %      MCV 87.7 fL      MCH 28.8 pg      MCHC 32.8 g/dL      RDW 12.6 %      RDW-SD 40.6 fl      MPV 8.8 fL      Platelets 426 10*3/mm3      Neutrophil % 61.2 %      Lymphocyte % 32.0 %      Monocyte % 5.1 %      Eosinophil % 0.8 %      Basophil % 0.7 %      Immature Grans % 0.2 %      Neutrophils, Absolute 5.29 10*3/mm3      Lymphocytes, Absolute 2.77 10*3/mm3      Monocytes, Absolute 0.44 10*3/mm3      Eosinophils, Absolute 0.07 10*3/mm3      Basophils, Absolute 0.06 10*3/mm3      Immature Grans, Absolute 0.02 10*3/mm3      nRBC 0.0 /100 WBC              No radiology results from the last 24 hrs       MDM     Amount and/or Complexity of Data Reviewed  Clinical lab tests: reviewed        Initial impression of presenting illness: Patient is a 52-year-old female here today for admission for her ankle surgery.    DDX: includes but is not limited to: Known comminuted ankle fracture to the left    Patient arrives hemodynamically stable with vitals interpreted by myself.     Pertinent features from physical exam: ***.    Initial diagnostic plan: ***    Results from initial plan were reviewed and interpreted by me revealing ***    Diagnostic information from other sources: ***    Interventions / Re-evaluation: ***    Results/clinical rationale were discussed with ***    Consultations/Discussion of results with other physicians: I spoke with Dr. Josue about the patient.  Asked if the patient could be admitted to the hospitalist service and he will take the patient to surgery in  the morning.    Disposition plan: ***  -----        Final diagnoses:   None

## 2023-09-27 NOTE — CASE MANAGEMENT/SOCIAL WORK
Discharge Planning Assessment  [facility]     Patient Name: Rashida Valles  MRN: [id number]  Today's Date: 9/27/2023    Admit Date: 9/26/2023    Plan: DCP   Discharge Needs Assessment       Row Name 09/27/23 1626       Living Environment    People in Home alone    Current Living Arrangements homeless    Potentially Unsafe Housing Conditions none    Primary Care Provided by self    Provides Primary Care For no one;no one, unable/limited ability to care for self    Family Caregiver if Needed significant other    Quality of Family Relationships unable to assess    Able to Return to Prior Arrangements yes       Resource/Environmental Concerns    Resource/Environmental Concerns none    Transportation Concerns no car       Transition Planning    Patient/Family Anticipates Transition to inpatient rehabilitation facility;shelter;home    Patient/Family Anticipated Services at Transition skilled nursing    Transportation Anticipated family or friend will provide       Discharge Needs Assessment    Readmission Within the Last 30 Days no previous admission in last 30 days    Equipment Currently Used at Home wheelchair    Concerns to be Addressed discharge planning    Current Discharge Risk homeless                   Discharge Plan       Row Name 09/27/23 1627       Plan    Plan DCP    Patient/Family in Agreement with Plan yes    Plan Comments SW met with pt at bedside for discharge planning. Pt is homeless here in [place] and lives on the [address]. Pt had surgery today and could benefit from STR. Therapy to assess. Pt admits to smoking pot but states she will stop while in STR. Pt is agreeable for placement. Pt gets income $995 dollars a month. Pt does not have a PCP. Pt has significant other who helps assist as needed. Pt has wheelchair. Goal is TBD. SW/CM will send referals when able. SW/CM will continue to follow for dc needs.    Final Discharge Disposition Code 30 - still a patient                  Continued Care and  Services - Admitted Since 9/26/2023    Coordination has not been started for this encounter.          Demographic Summary       Row Name 09/27/23 1626       General Information    Admission Type observation    Arrived From home    Referral Source admission list    Reason for Consult discharge planning    Preferred Language English                   Functional Status       Row Name 09/27/23 1626       Functional Status, IADL    Medications independent    Meal Preparation independent    Housekeeping independent    Laundry independent       Mental Status Summary    Recent Changes in Mental Status/Cognitive Functioning unable to assess       Employment/    Employment Status disabled                   Psychosocial       Row Name 09/27/23 1626       Developmental Stage (Eriksson's)    Developmental Stage Stage 7 (35-65 years/Middle Adulthood) Generativity vs. Stagnation                   Abuse/Neglect    No documentation.                  Legal    No documentation.                  Substance Abuse    No documentation.                  Patient Forms    No documentation.                     MIR Lott

## 2023-09-27 NOTE — CASE MANAGEMENT/SOCIAL WORK
Case Management/Social Work    Patient Name:  Rashida Valles  YOB: 1970  MRN: [id number]  Admit Date:  9/26/2023        ROXANNE attempted to meet with pt at bedside for discharge planning. Pt is currently in OR for surgery. SW spoke with provider who states pt would benefit from STR, but has denied. Pt is homeless living on the streets and wants to dc back to the streets. Pt has w/c she uses for mobility. ROXANNE will attempt to meet with pt at a later time.       Electronically signed by:  MIR Lott  09/27/23 12:21 EDT

## 2023-09-27 NOTE — PLAN OF CARE
Goal Outcome Evaluation:              Outcome Evaluation: New admission to [facility] - Telemetry/Med-Surg overflow - from emergency room. Vital signs currently stable on RA. PRN pain medication given. Cast noted to left lower leg.

## 2023-09-27 NOTE — ANESTHESIA POSTPROCEDURE EVALUATION
Patient: Rashida Valles    Procedure Summary       Date: 09/27/23 Room / Location: [facility] OR 3 /  [facility]    Anesthesia Start: 1128 Anesthesia Stop:     Procedure: ANKLE OPEN REDUCTION INTERNAL FIXATION (Left: Ankle) Diagnosis:     Surgeons: Brian Josue MD Provider: Nba Rachel CRNA    Anesthesia Type: general with block ASA Status: 3 - Emergent            Anesthesia Type: general with block    Vitals  No vitals data found for the desired time range.          Post Anesthesia Care and Evaluation    Patient location during evaluation: PACU  Patient participation: complete - patient participated  Level of consciousness: awake  Pain score: 0  Pain management: adequate    Airway patency: patent  Anesthetic complications: No anesthetic complications  PONV Status: none  Cardiovascular status: acceptable  Respiratory status: acceptable, face mask and spontaneous ventilation  Hydration status: acceptable    Comments: See R.N. note for postop vital signs.vsss resp spont, reflexes intact, responsive, report given to pacu nurse

## 2023-09-27 NOTE — ANESTHESIA PROCEDURE NOTES
Peripheral Block    Pre-sedation assessment completed: 9/27/2023 12:23 PM    Patient reassessed immediately prior to procedure    Patient location during procedure: pre-op  Start time: 9/27/2023 10:22 AM  Stop time: 9/27/2023 10:44 AM  Reason for block: at surgeon's request and post-op pain management  Performed by  DESTINY/CAA: Nba Rachel CRNA  Preanesthetic Checklist  Completed: patient identified, IV checked, site marked, risks and benefits discussed, surgical consent, monitors and equipment checked, pre-op evaluation and timeout performed  Prep:  Pt Position: supine  Sterile barriers:alcohol skin prep, gloves, mask, cap and washed/disinfected hands  Prep: ChloraPrep and air dry 3 min per clock  Procedure    Sedation: yes  Performed under: MAC  Guidance:ultrasound guided    ULTRASOUND INTERPRETATION.  Using ultrasound guidance a 21 G gauge needle was placed in close proximity to the nerve, at which point, under ultrasound guidance anesthetic was injected in the area of the nerve and spread of the anesthesia was seen on ultrasound in close proximity thereto.  There were no abnormalities seen on ultrasound; a digital image was taken; and the patient tolerated the procedure with no complications. Images:still images obtained, printed/placed on chart    Laterality:left  Block Type:adductor canal block and popliteal  Injection Technique:single-shot  Needle Type:echogenic and short-bevel  Needle Gauge:21 G  Resistance on Injection: none          Medications  Comment:Pop block marcaine 0.5% 10 ml with lidocaine 2% 10 ml with decadron 10 mg  Adductor canal block 0.25% 10 ml     Post Assessment  Injection Assessment: negative aspiration for heme, no paresthesia on injection and incremental injection  Patient Tolerance:comfortable throughout block  Complications:no

## 2023-09-27 NOTE — NURSING NOTE
"Pt's boyfriend came to visit, Pt decided to leave, states she has to smoke, \"doesn't want to go to rehab if they're gonna take my check\", wishes to leave. I advised on risks of leaving and benefits of staying, offered a nicotine patch and reminded Pt of pain management interventions available. Educated risk of infection, s/s of infection and encouraged Pt to f/u with primary care or surgeon d/t surgery today  "

## 2023-09-27 NOTE — OP NOTE
Orthopedics ANKLE OPEN REDUCTION INTERNAL FIXATION  Op Note    Rashida Valles  9/27/2023    Pre-op Diagnosis:   Left distal tibial displaced intra-articular fracture    Post-op Diagnosis:  Same  Procedure open reduction internal fixation left distal tibia    Anesthesia:  Choice    Staff:   Circulator: David Felton RN; Jackie Edwards RN  Scrub Person: Ava Odonnell; Simran Soler; Anabella Phoenix      Specimens: None      Drains: None    Indication  52-year-old female that had a injury to her left ankle she had a displaced intra-articular distal tibial fracture.  She had persistent symptoms and elected proceed with operative intervention.  She understood the procedure she understood the risk and benefits including infection loosening instability malunion nonunion stiffness risk with anesthesia loss of life and limb and decides to proceed.      Procedure  Description patient was identified in the holding room her left ankle was marked take the operating room administered a general anesthetic per anesthesia team administered perioperative antibiotics position prepped draped sterile fashion to the left lower extremity.  Attention made the left lower extremity where is exsanguinated with an Esmarch tourniquet elevated 300 mmHg.  Attention made the left ankle where longitudinal incision was made over the anterior medial ankle dissection carried down through subcutaneous tissue sharp to dull dissection was carried out and to the level of the distal fibula subperiosteal section was then carried out about the distal fibula.  The fracture site was identified hematoma was debrided and the piece anterior tibial piece was mobilized.  It was then reduced with a bone reduction clamp and held with K wires x2.  An anterior plate was then applied.  C-arm imaging was obtained and adequate alignment was noted.  It was then affixed proximally with 6 cortices and distally with 2 screws.  C-arm imaging was obtained and  adequate alignment was noted on AP and lateral views.  The wounds were then milind irrigated and closed in 2 layers.  Sterile dressings were applied patient was emerged anesthesia and taken to PACU in stable condition.  Plan will be for remaining nonweightbearing left lower extremity.    Complications:  None    Tourniquet:: 300 mmHg    Dressing: Sterile splint    Disposition: PACU  Estimated blood loss minimal    Brian Josue MD     Date: 9/27/2023  Time: 12:52 EDT

## 2023-09-27 NOTE — PROGRESS NOTES
[facility] HOSPITALIST    PROGRESS NOTE    Name:  Rashida Valles   Age:  52 y.o.  Sex:  female  :  1970  MRN:  [id number]   Visit Number:  [id number]  Admission Date:  2023  Date Of Service:  23  Primary Care Physician:  Provider, No Known     LOS: 0 days :    Chief Complaint:      Left ankle fracture    Subjective:    Patient seen and examined this morning at bedside in overflow area.  Patient advised she is currently homeless and living on the streets.  She is agreeable to surgery today.  She is pleasant in conversation.  States that she enjoys smoking marijuana regularly.      Hospital Course:    The patient is a 52-year-old female with a history of homelessness, chronic tobacco abuse, COPD, fibromyalgia, polysubstance use, who presented to the emergency room with a history of a left ankle fracture.  History obtained from patient and ER record.  She states that she fell about 3 weeks ago going into a store to buy cigarettes, had immediate pain and cracking of her left ankle at that time.  She was diagnosed with ankle fracture, had orthopedic follow-up and was placed in a cast.  She was due to have operative intervention, however per Dr. Josue, has had issues with following up and arranging surgery.  Apparently his office had notified her to plan on surgery on 2023 and to come to the hospital if she could.  She currently is only complaining of pain of the leg.  She notes she has been walking somewhat on the cast and did have another fall about 2 days ago.  She notes she falls quite a bit and uses a wheelchair a lot.  She smokes about a pack of cigarettes every 2 days, down from 3 packs a day.     In the ER, she was hemodynamically stable.  Initial labs were unremarkable.  ER provider had talked with Dr. Paulino who requested patient be admitted to the hospitalist service with plan for surgery in the morning.  Was asked to admit thereafter    Review of Systems:     All  systems were reviewed and negative except as mentioned in subjective, assessment and plan.    Vital Signs:    Temp:  [97.3 °F (36.3 °C)-98 °F (36.7 °C)] 97.8 °F (36.6 °C)  Heart Rate:  [] 78  Resp:  [14-18] 16  BP: ()/(57-88) 96/65    Intake and output:    I/O last 3 completed shifts:  In: 420 [I.V.:420]  Out: 300 [Urine:300]  No intake/output data recorded.    Physical Examination:      General Appearance:  Alert and cooperative, pleasant middle aged female, no acute distress on exam   Head:  Atraumatic and normocephalic.   Eyes: Conjunctivae and sclerae normal, no icterus. No pallor.   Throat: No oral lesions, no thrush, oral mucosa moist.   Neck: Supple, trachea midline   Lungs:   Breath sounds heard bilaterally equally.  No wheezing or crackles. Unlabored on exam   Heart:  Normal S1 and S2, no murmur, no gallop, no rub. No JVD.   Abdomen:   Normal bowel sounds, no masses, no organomegaly. Soft, nontender, nondistended, no rebound tenderness.   Extremities: Supple, no edema, no cyanosis, no clubbing, cast to left lower extremity   Skin: No bleeding or rash   Neurologic: Alert and oriented x 3. No facial asymmetry. Moves all four limbs. No tremors.      Laboratory results:    Results from last 7 days   Lab Units 09/26/23 2056   SODIUM mmol/L 140   POTASSIUM mmol/L 4.1   CHLORIDE mmol/L 102   CO2 mmol/L 24.8   BUN mg/dL 12   CREATININE mg/dL 0.71   CALCIUM mg/dL 10.1   BILIRUBIN mg/dL 0.2   ALK PHOS U/L 167*   ALT (SGPT) U/L 31   AST (SGOT) U/L 25   GLUCOSE mg/dL 80     Results from last 7 days   Lab Units 09/26/23 2056   WBC 10*3/mm3 8.65   HEMOGLOBIN g/dL 14.7   HEMATOCRIT % 44.8   PLATELETS 10*3/mm3 426     Results from last 7 days   Lab Units 09/26/23 2056   INR  0.89*             No results for input(s): PHART, QBO1KXL, PO2ART, RCY0QZC, BASEEXCESS in the last 8760 hours.   I have reviewed the patient's laboratory results.    Radiology results:    XR Ankle 3+ View Left    Result Date:  9/27/2023  PROCEDURE: XR ANKLE 3+ VW LEFT-  History: fracture follow up; S82.892A-Other fracture of left lower leg, initial encounter for closed fracture  COMPARISON: September 5, 2023.  FINDINGS:  A 3 view exam was obtained. There is loss of bony detail due to overlying cast material. The anterior and posterior distal tibial fractures show increased separation of the fracture fragments. There is some periosteal reaction anteriorly and medially. Cannot evaluate for effusion.      Impression: Increased separation of fracture fragments of the anterior and posterior distal tibial fractures.       Images were reviewed, interpreted, and dictated by Dr. Ketty Frey MD Transcribed by Tg Martell PA-C.      XR Chest 1 View    Result Date: 9/27/2023  PROCEDURE: XR CHEST 1 VW-  HISTORY: preop for surgery, smoking copd; S82.892A-Other fracture of left lower leg, initial encounter for closed fracture  COMPARISON: September 27, 2023.  FINDINGS: The heart is normal in size. There is mild interstitial disease similar to prior exam. Calcified granulomas noted. Remote right posterior rib fracture again noted. Bronchial wall thickening noted suggesting bronchitis.. The mediastinum is unremarkable. There is no pneumothorax.  There are no acute osseous abnormalities.      Impression: No acute infiltrate seen..      This report was signed and finalized on 9/27/2023 9:03 AM by Ketty Frey MD.      Peripheral Block    Result Date: 9/27/2023  Nba Rachel CRNA     9/27/2023 10:47 AM Peripheral Block Pre-sedation assessment completed: 9/27/2023 12:23 PM Patient reassessed immediately prior to procedure Patient location during procedure: pre-op Start time: 9/27/2023 10:22 AM Stop time: 9/27/2023 10:44 AM Reason for block: at surgeon's request and post-op pain management Performed by DESTINY/CAA: Nba Rachel CRNA Preanesthetic Checklist Completed: patient identified, IV checked, site marked, risks and benefits discussed,  surgical consent, monitors and equipment checked, pre-op evaluation and timeout performed Prep: Pt Position: supine Sterile barriers:alcohol skin prep, gloves, mask, cap and washed/disinfected hands Prep: ChloraPrep and air dry 3 min per clock Procedure Sedation: yes Performed under: MAC Guidance:ultrasound guided ULTRASOUND INTERPRETATION.  Using ultrasound guidance a 21 G gauge needle was placed in close proximity to the nerve, at which point, under ultrasound guidance anesthetic was injected in the area of the nerve and spread of the anesthesia was seen on ultrasound in close proximity thereto.  There were no abnormalities seen on ultrasound; a digital image was taken; and the patient tolerated the procedure with no complications. Images:still images obtained, printed/placed on chart Laterality:left Block Type:adductor canal block and popliteal Injection Technique:single-shot Needle Type:echogenic and short-bevel Needle Gauge:21 G Resistance on Injection: none Medications Comment:Pop block marcaine 0.5% 10 ml with lidocaine 2% 10 ml with decadron 10 mg Adductor canal block 0.25% 10 ml Post Assessment Injection Assessment: negative aspiration for heme, no paresthesia on injection and incremental injection Patient Tolerance:comfortable throughout block Complications:no     FL C Arm During Surgery    Result Date: 9/27/2023  This procedure was auto-finalized with no dictation required.   I have reviewed the patient's radiology reports.    Medication Review:     I have reviewed the patient's active and prn medications.     Problem List:      Ankle fracture      Assessment:    Recent comminuted, intra-articular distal tibia fracture with anterior subluxation of the talus, left  Tiny avulsion fracture from distal fibula  Chronic tobacco abuse  Polysubstance use  Hypertension  Homelessness  Impaired mobility and ADL    Plan:    Ankle fracture:  - Orthopedics requested admission for surgery--Liat taking to OR today   -NPO  pending surgery.    -As needed pain control and antiemetics.    -Encouraged tobacco cessation.    -PT and OT evaluations post-op    -VTE prophylaxis with Lovenox    Chronic  -Complicates all aspects of care  -Case management consulted for discharge planning    DVT Prophylaxis: Lovenox  Code Status: Full code  Diet: NPO  Discharge Plan: Pending    Irene Valverde, APRN  09/27/23  12:53 EDT    Dictated utilizing Dragon dictation.

## 2023-09-27 NOTE — ANESTHESIA PREPROCEDURE EVALUATION
Anesthesia Evaluation     Patient summary reviewed and Nursing notes reviewed   NPO Solid Status: > 8 hours  NPO Liquid Status: > 8 hours           Airway   Mallampati: II  TM distance: >3 FB  Neck ROM: full  Possible difficult intubation and No difficulty expected  Dental      Pulmonary    (+) a smoker Current, COPD, asthma,decreased breath sounds  Cardiovascular     ECG reviewed        Neuro/Psych  (+) headaches  GI/Hepatic/Renal/Endo    (+) GERD    Musculoskeletal     (+) arthralgias, back pain, chronic pain, myalgias, neck pain  Abdominal    Substance History   (+) drug use     OB/GYN          Other   blood dyscrasia anemia,   history of cancer active    ROS/Med Hx Other: Substance use   Labs reviewed tox + for thc and meth  EKG sr  Cxr he heart is normal in size. There is mild interstitial  disease similar to prior exam. Calcified granulomas noted. Remote right  posterior rib fracture again noted. Bronchial wall thickening noted  suggesting bronchitis.. The mediastinum is unremarkable. There is no  pneumothorax.  There are no acute osseous abnormalities.     IMPRESSION:  No acute infiltrate seen..                    Anesthesia Plan    ASA 4 - emergent     general with block     (Risks and benefits discussed including risk of aspiration, recall and dental damage. All patient questions answered.    Will continue with plan of care.  Pop block and acd for popc)  intravenous induction     Anesthetic plan, risks, benefits, and alternatives have been provided, discussed and informed consent has been obtained with: patient.  Pre-procedure education provided    CODE STATUS:    Code Status (Patient has no pulse and is not breathing): CPR (Attempt to Resuscitate)  Medical Interventions (Patient has pulse or is breathing): Full Support

## 2023-09-27 NOTE — CONSULTS
Patient Care Team:  Provider, No Known as PCP - General  Past Medical History:   Diagnosis Date    Acid reflux     Anemia     Asthma     COPD (chronic obstructive pulmonary disease)     Fibromyalgia, primary     Fractures     Low back pain     Neck pain     Ovarian cyst     Shoulder pain      Past Surgical History:   Procedure Laterality Date    CHOLECYSTECTOMY      COSMETIC SURGERY      HYSTERECTOMY      MOUTH SURGERY      TUBAL ABDOMINAL LIGATION       Family History   Problem Relation Age of Onset    Cancer Mother     Diabetes Mother     Heart disease Mother     Cancer Father     Diabetes Father     Heart disease Father     Arthritis Father     Asthma Father      Social History     Tobacco Use    Smoking status: Every Day     Packs/day: 0.25     Types: Cigarettes   Vaping Use    Vaping Use: Never used   Substance Use Topics    Alcohol use: No    Drug use: Yes     Types: Marijuana     Medications Prior to Admission   Medication Sig Dispense Refill Last Dose    acetaminophen (TYLENOL) 650 MG 8 hr tablet Take 1 tablet by mouth Every 8 (Eight) Hours As Needed for Mild Pain.   9/26/2023     Allergies:  Baclofen and Morphine and related  Chief complaint left ankle fracture    Subjective     Patient is a 52 y.o. female presents with .  Left ankle pain she sustained a fall few weeks ago she had been placed in a cast to allow for soft tissue swelling to subside.  She has been noncompliant with her follow-up and she presented back to the ER last evening and we admitted her for surgical intervention today.  She has a intra-articular displaced distal tibia fracture    Review of Systems   Pertinent items are noted in HPI    History  Past Medical History:   Diagnosis Date    Acid reflux     Anemia     Asthma     COPD (chronic obstructive pulmonary disease)     Fibromyalgia, primary     Fractures     Low back pain     Neck pain     Ovarian cyst     Shoulder pain      Past Surgical History:   Procedure Laterality Date     CHOLECYSTECTOMY      COSMETIC SURGERY      HYSTERECTOMY      MOUTH SURGERY      TUBAL ABDOMINAL LIGATION       Family History   Problem Relation Age of Onset    Cancer Mother     Diabetes Mother     Heart disease Mother     Cancer Father     Diabetes Father     Heart disease Father     Arthritis Father     Asthma Father      Social History     Tobacco Use    Smoking status: Every Day     Packs/day: 0.25     Types: Cigarettes   Vaping Use    Vaping Use: Never used   Substance Use Topics    Alcohol use: No    Drug use: Yes     Types: Marijuana     Medications Prior to Admission   Medication Sig Dispense Refill Last Dose    acetaminophen (TYLENOL) 650 MG 8 hr tablet Take 1 tablet by mouth Every 8 (Eight) Hours As Needed for Mild Pain.   9/26/2023     Allergies:  Baclofen and Morphine and related    Objective     Vital Signs  Temp:  [97.3 °F (36.3 °C)-98 °F (36.7 °C)] 97.3 °F (36.3 °C)  Heart Rate:  [] 89  Resp:  [16-18] 18  BP: (102-122)/(62-88) 109/77    Physical Exam:      General Appearance:    Alert, cooperative, in no acute distress   Head:    Normocephalic, without obvious abnormality, atraumatic   Eyes:            Lids and lashes normal, conjunctivae and sclerae normal, no   icterus, no pallor, corneas clear, PERRLA   Ears:    Ears appear intact with no abnormalities noted   Throat:   No oral lesions, no thrush, oral mucosa moist   Neck:   No adenopathy, supple, trachea midline, no thyromegaly, no     carotid bruit, no JVD   Back:     No kyphosis present, no scoliosis present, no skin lesions,       erythema or scars, no tenderness to percussion or                   palpation,   range of motion normal   Lungs:     Clear to auscultation,respirations regular, even and                   unlabored    Heart:    Regular rhythm and normal rate, normal S1 and S2, no            murmur, no gallop, no rub, no click   Breast Exam:    Deferred   Abdomen:     Normal bowel sounds, no masses, no organomegaly, soft         non-tender, non-distended, no guarding, no rebound                 tenderness   Genitalia:    Deferred   Extremities: She has a cast in place in her left lower extremity she can wiggle her toes she has brisk cap refill   Pulses:   Pulses palpable and equal bilaterally   Skin:   No bleeding, bruising or rash   Lymph nodes:   No palpable adenopathy   Neurologic:   Cranial nerves 2 - 12 grossly intact, sensation intact, DTR        present and equal bilaterally       Results Review:    I reviewed the patient's new clinical results.    Assessment & Plan       Ankle fracture      Plan is for operative intervention today for open reduction internal fixation of left ankle fracture.  She understands that she understands the risk of infection stiffness malunion nonunion arthritis risk with anesthesia loss of life and limb and desires to proceed.  We will repeat her x-rays this morning    I discussed the patients findings and my recommendations with patient.     Brian Josue MD  09/27/23  08:04 EDT

## 2023-09-27 NOTE — H&P
[facility]      Name:  Rashida Valles   Age:  52 y.o.  Sex:  female  :  1970  MRN:  [id number]   Visit Number:  [id number]  Admission Date:  2023  Date Of Service:  23  Primary Care Physician:  Provider, No Known    Chief Complaint:     Left ankle fracture    History Of Presenting Illness:      The patient is a 52-year-old female with a history of homelessness, chronic tobacco abuse, COPD, fibromyalgia, polysubstance use, who presented to the emergency room with a history of a left ankle fracture.  History obtained from patient and ER record.  She states that she fell about 3 weeks ago going into a store to buy cigarettes, had immediate pain and cracking of her left ankle at that time.  She was diagnosed with ankle fracture, had orthopedic follow-up and was placed in a cast.  She was due to have operative intervention, however per Dr. Josue, has had issues with following up and arranging surgery.  Apparently his office had notified her to plan on surgery on 2023 and to come to the hospital if she could.  She currently is only complaining of pain of the leg.  She notes she has been walking somewhat on the cast and did have another fall about 2 days ago.  She notes she falls quite a bit and uses a wheelchair a lot.  She smokes about a pack of cigarettes every 2 days, down from 3 packs a day.    In the ER, she was hemodynamically stable.  Initial labs were unremarkable.  ER provider had talked with Dr. Paulino who requested patient be admitted to the hospitalist service with plan for surgery in the morning.  Was asked to admit thereafter.    Review Of Systems:    All systems were reviewed and negative except as mentioned in history of presenting illness, assessment and plan.    Past Medical History: Patient  has a past medical history of Acid reflux, Anemia, Asthma, COPD (chronic obstructive pulmonary disease), Fibromyalgia, primary,  Fractures, Low back pain, Neck pain, Ovarian cyst, and Shoulder pain.    Past Surgical History: Patient  has a past surgical history that includes Cosmetic surgery; Hysterectomy; Tubal ligation; Cholecystectomy; and Mouth surgery.    Social History: Patient  reports that she has been smoking cigarettes. She has been smoking an average of .25 packs per day. She does not have any smokeless tobacco history on file. She reports current drug use. Drug: Marijuana. She reports that she does not drink alcohol.    Family History:  Patient's family history has been reviewed and found to be noncontributory.     Allergies:      Baclofen and Morphine and related    Home Medications:    Prior to Admission Medications       Prescriptions Last Dose Informant Patient Reported? Taking?    acetaminophen (TYLENOL) 650 MG 8 hr tablet 9/26/2023  Yes Yes    Take 1 tablet by mouth Every 8 (Eight) Hours As Needed for Mild Pain.          ED Medications:    Medications   sodium chloride 0.9 % flush 10 mL (has no administration in time range)   sodium chloride 0.9 % flush 10 mL (has no administration in time range)   sodium chloride 0.9 % infusion (100 mL/hr Intravenous New Bag 9/26/23 2351)   nicotine (NICODERM CQ) 14 MG/24HR patch 1 patch (1 patch Transdermal Medication Applied 9/27/23 0020)   ketorolac (TORADOL) injection 15 mg (has no administration in time range)   ketorolac (TORADOL) injection 15 mg (15 mg Intravenous Given 9/26/23 2352)   fentaNYL citrate (PF) (SUBLIMAZE) injection 25 mcg (25 mcg Intravenous Given 9/27/23 0146)   ondansetron (ZOFRAN) injection 4 mg (4 mg Intravenous Given 9/27/23 0146)     Vital Signs:  Temp:  [97.3 °F (36.3 °C)-98 °F (36.7 °C)] 97.3 °F (36.3 °C)  Heart Rate:  [] 89  Resp:  [16-18] 18  BP: (102-122)/(62-88) 109/77        09/26/23 2003   Weight: 68 kg (150 lb)     Body mass index is 24.21 kg/m².    Physical Exam:     Most recent vital Signs: /77 (BP Location: Left arm, Patient Position:  "Sitting)   Pulse 89   Temp 97.3 °F (36.3 °C) (Temporal)   Resp 18   Ht 167.6 cm (66\")   Wt 68 kg (150 lb)   SpO2 97%   BMI 24.21 kg/m²     Physical Exam  Constitutional:       General: She is not in acute distress.     Appearance: Normal appearance. She is not toxic-appearing.   HENT:      Mouth/Throat:      Mouth: Mucous membranes are dry.   Eyes:      Extraocular Movements: Extraocular movements intact.      Pupils: Pupils are equal, round, and reactive to light.   Cardiovascular:      Rate and Rhythm: Normal rate and regular rhythm.      Pulses: Normal pulses.      Heart sounds: No murmur heard.  Pulmonary:      Effort: Pulmonary effort is normal. No respiratory distress.      Breath sounds: No wheezing.   Abdominal:      General: Abdomen is flat. Bowel sounds are normal. There is no distension.      Tenderness: There is no abdominal tenderness.   Musculoskeletal:      Left lower leg: Edema (In a cast) present.   Skin:     General: Skin is warm.      Capillary Refill: Capillary refill takes less than 2 seconds.      Findings: No lesion.   Neurological:      General: No focal deficit present.      Mental Status: She is alert. Mental status is at baseline.       Laboratory data:    I have reviewed the labs done in the emergency room.    Results from last 7 days   Lab Units 09/26/23 2056   SODIUM mmol/L 140   POTASSIUM mmol/L 4.1   CHLORIDE mmol/L 102   CO2 mmol/L 24.8   BUN mg/dL 12   CREATININE mg/dL 0.71   CALCIUM mg/dL 10.1   BILIRUBIN mg/dL 0.2   ALK PHOS U/L 167*   ALT (SGPT) U/L 31   AST (SGOT) U/L 25   GLUCOSE mg/dL 80     Results from last 7 days   Lab Units 09/26/23 2056   WBC 10*3/mm3 8.65   HEMOGLOBIN g/dL 14.7   HEMATOCRIT % 44.8   PLATELETS 10*3/mm3 426     Results from last 7 days   Lab Units 09/26/23 2056   INR  0.89*                         Results from last 7 days   Lab Units 09/27/23  0329   COLOR UA  Yellow   GLUCOSE UA  Negative   KETONES UA  Trace*   LEUKOCYTES UA  Small (1+)*   PH, " URINE  5.5   BILIRUBIN UA  Negative   UROBILINOGEN UA  0.2 E.U./dL   RBC UA /HPF None Seen   WBC UA /HPF 3-5*       Pain Management Panel          Latest Ref Rng & Units 9/27/2023   Pain Management Panel   Amphetamine, Urine Qual Negative Positive    Barbiturates Screen, Urine Negative Negative    Benzodiazepine Screen, Urine Negative Negative    Buprenorphine, Screen, Urine Negative Negative    Cocaine Screen, Urine Negative Negative    Fentanyl, Urine Negative Negative    Methadone Screen , Urine Negative Negative    Methamphetamine, Ur Negative Positive        EKG:      Preop pending    Radiology:    No radiology results for the last 3 days    Assessment:    Recent comminuted, intra-articular distal tibia fracture with anterior subluxation of the talus, left  Tiny avulsion fracture from distal fibula  Chronic tobacco abuse  Polysubstance use  Hypertension  Homelessness  Impaired mobility and ADLs    Plan:    Admit for observation.    Ankle fracture:  Per ER, orthopedics requesting admission for operative intervention in the morning.  Have added PT/INR, urinalysis, UDS, chest x-ray and EKG for preop purposes.  Otherwise, is cleared for surgery.  We will keep NPO.  As needed pain control and antiemetics.  Encouraged tobacco cessation.  PT and OT evaluations.  VTE prophylaxis.    Patient otherwise meets observation level care anticipate less than 2 midnight stay.  Further recommendations depend upon clinical course.  Plan of care discussed with patient at bedside.    Risk Assessment: Moderate  DVT Prophylaxis: Lovenox  Code Status: Full  Diet: N.p.o.    Advance Care Planning   ACP discussion was held with the patient during this visit. Patient does not have an advance directive, declines further assistance.           Dorita Downing,   09/27/23  03:57 EDT    Dictated utilizing Dragon dictation.

## 2023-09-27 NOTE — PAYOR COMM NOTE
"TO:[facility]  FROM:ROSANNA KEARNS, RN PHONE [phone]  OBSERVATION CLINICALS    Rashida Valles (52 y.o. Female)       Date of Birth   1970    Social Security Number       Address   [address] [place], [place] [address]    Home Phone   [phone]    MRN   [id number]       Synagogue   None    Marital Status                               Admission Date   23    Admission Type   Emergency    Admitting Provider   Dorita Downing DO    Attending Provider   Dorita Downing DO    Department, Room/Bed   [facility] OR, [facility] OR/MAIN OR       Discharge Date       Discharge Disposition       Discharge Destination                                 Attending Provider: Dorita Downing DO    Allergies: Baclofen, Morphine And Related    Isolation: None   Infection: None   Code Status: CPR    Ht: 167.6 cm (66\")   Wt: 68 kg (150 lb)    Admission Cmt: None   Principal Problem: Ankle fracture [S82.899A]                   Active Insurance as of 2023       Primary Coverage       Payor Plan Insurance Group Employer/Plan Group    [employer] [place] [employer]ICA[employer]        Payor Plan Address Payor Plan Phone Number Payor Plan Fax Number Effective Dates    [address] [address] [phone]  2019 - None Entered    [place] [address]         Subscriber Name Subscriber Birth Date Member ID       RASHIDA VALLES 1970 [id number]                     Emergency Contacts            No emergency contacts on file.                 History & Physical        Dorita Downing DO at 23 0357            [facility] HOSPITALIST   HISTORY AND PHYSICAL      Name:  Rashida Valles   Age:  52 y.o.  Sex:  female  :  1970  MRN:  [id number]   Visit Number:  [id number]  Admission Date:  2023  Date Of Service:  23  Primary Care Physician:  Provider, No Known    Chief Complaint:     Left ankle fracture    History Of Presenting " Illness:      The patient is a 52-year-old female with a history of homelessness, chronic tobacco abuse, COPD, fibromyalgia, polysubstance use, who presented to the emergency room with a history of a left ankle fracture.  History obtained from patient and ER record.  She states that she fell about 3 weeks ago going into a store to buy cigarettes, had immediate pain and cracking of her left ankle at that time.  She was diagnosed with ankle fracture, had orthopedic follow-up and was placed in a cast.  She was due to have operative intervention, however per Dr. Josue, has had issues with following up and arranging surgery.  Apparently his office had notified her to plan on surgery on 9/27/2023 and to come to the hospital if she could.  She currently is only complaining of pain of the leg.  She notes she has been walking somewhat on the cast and did have another fall about 2 days ago.  She notes she falls quite a bit and uses a wheelchair a lot.  She smokes about a pack of cigarettes every 2 days, down from 3 packs a day.    In the ER, she was hemodynamically stable.  Initial labs were unremarkable.  ER provider had talked with Dr. Paulino who requested patient be admitted to the hospitalist service with plan for surgery in the morning.  Was asked to admit thereafter.    Review Of Systems:    All systems were reviewed and negative except as mentioned in history of presenting illness, assessment and plan.    Past Medical History: Patient  has a past medical history of Acid reflux, Anemia, Asthma, COPD (chronic obstructive pulmonary disease), Fibromyalgia, primary, Fractures, Low back pain, Neck pain, Ovarian cyst, and Shoulder pain.    Past Surgical History: Patient  has a past surgical history that includes Cosmetic surgery; Hysterectomy; Tubal ligation; Cholecystectomy; and Mouth surgery.    Social History: Patient  reports that she has been smoking cigarettes. She has been smoking an average of .25 packs per day. She  "does not have any smokeless tobacco history on file. She reports current drug use. Drug: Marijuana. She reports that she does not drink alcohol.    Family History:  Patient's family history has been reviewed and found to be noncontributory.     Allergies:      Baclofen and Morphine and related    Home Medications:    Prior to Admission Medications       Prescriptions Last Dose Informant Patient Reported? Taking?    acetaminophen (TYLENOL) 650 MG 8 hr tablet 9/26/2023  Yes Yes    Take 1 tablet by mouth Every 8 (Eight) Hours As Needed for Mild Pain.          ED Medications:    Medications   sodium chloride 0.9 % flush 10 mL (has no administration in time range)   sodium chloride 0.9 % flush 10 mL (has no administration in time range)   sodium chloride 0.9 % infusion (100 mL/hr Intravenous New Bag 9/26/23 2351)   nicotine (NICODERM CQ) 14 MG/24HR patch 1 patch (1 patch Transdermal Medication Applied 9/27/23 0020)   ketorolac (TORADOL) injection 15 mg (has no administration in time range)   ketorolac (TORADOL) injection 15 mg (15 mg Intravenous Given 9/26/23 2352)   fentaNYL citrate (PF) (SUBLIMAZE) injection 25 mcg (25 mcg Intravenous Given 9/27/23 0146)   ondansetron (ZOFRAN) injection 4 mg (4 mg Intravenous Given 9/27/23 0146)     Vital Signs:  Temp:  [97.3 °F (36.3 °C)-98 °F (36.7 °C)] 97.3 °F (36.3 °C)  Heart Rate:  [] 89  Resp:  [16-18] 18  BP: (102-122)/(62-88) 109/77        09/26/23 2003   Weight: 68 kg (150 lb)     Body mass index is 24.21 kg/m².    Physical Exam:     Most recent vital Signs: /77 (BP Location: Left arm, Patient Position: Sitting)   Pulse 89   Temp 97.3 °F (36.3 °C) (Temporal)   Resp 18   Ht 167.6 cm (66\")   Wt 68 kg (150 lb)   SpO2 97%   BMI 24.21 kg/m²     Physical Exam  Constitutional:       General: She is not in acute distress.     Appearance: Normal appearance. She is not toxic-appearing.   HENT:      Mouth/Throat:      Mouth: Mucous membranes are dry.   Eyes:      " Extraocular Movements: Extraocular movements intact.      Pupils: Pupils are equal, round, and reactive to light.   Cardiovascular:      Rate and Rhythm: Normal rate and regular rhythm.      Pulses: Normal pulses.      Heart sounds: No murmur heard.  Pulmonary:      Effort: Pulmonary effort is normal. No respiratory distress.      Breath sounds: No wheezing.   Abdominal:      General: Abdomen is flat. Bowel sounds are normal. There is no distension.      Tenderness: There is no abdominal tenderness.   Musculoskeletal:      Left lower leg: Edema (In a cast) present.   Skin:     General: Skin is warm.      Capillary Refill: Capillary refill takes less than 2 seconds.      Findings: No lesion.   Neurological:      General: No focal deficit present.      Mental Status: She is alert. Mental status is at baseline.       Laboratory data:    I have reviewed the labs done in the emergency room.    Results from last 7 days   Lab Units 09/26/23 2056   SODIUM mmol/L 140   POTASSIUM mmol/L 4.1   CHLORIDE mmol/L 102   CO2 mmol/L 24.8   BUN mg/dL 12   CREATININE mg/dL 0.71   CALCIUM mg/dL 10.1   BILIRUBIN mg/dL 0.2   ALK PHOS U/L 167*   ALT (SGPT) U/L 31   AST (SGOT) U/L 25   GLUCOSE mg/dL 80     Results from last 7 days   Lab Units 09/26/23 2056   WBC 10*3/mm3 8.65   HEMOGLOBIN g/dL 14.7   HEMATOCRIT % 44.8   PLATELETS 10*3/mm3 426     Results from last 7 days   Lab Units 09/26/23 2056   INR  0.89*                         Results from last 7 days   Lab Units 09/27/23  0329   COLOR UA  Yellow   GLUCOSE UA  Negative   KETONES UA  Trace*   LEUKOCYTES UA  Small (1+)*   PH, URINE  5.5   BILIRUBIN UA  Negative   UROBILINOGEN UA  0.2 E.U./dL   RBC UA /HPF None Seen   WBC UA /HPF 3-5*       Pain Management Panel          Latest Ref Rng & Units 9/27/2023   Pain Management Panel   Amphetamine, Urine Qual Negative Positive    Barbiturates Screen, Urine Negative Negative    Benzodiazepine Screen, Urine Negative Negative    Buprenorphine,  Screen, Urine Negative Negative    Cocaine Screen, Urine Negative Negative    Fentanyl, Urine Negative Negative    Methadone Screen , Urine Negative Negative    Methamphetamine, Ur Negative Positive        EKG:      Preop pending    Radiology:    No radiology results for the last 3 days    Assessment:    Recent comminuted, intra-articular distal tibia fracture with anterior subluxation of the talus, left  Tiny avulsion fracture from distal fibula  Chronic tobacco abuse  Polysubstance use  Hypertension  Homelessness  Impaired mobility and ADLs    Plan:    Admit for observation.    Ankle fracture:  Per ER, orthopedics requesting admission for operative intervention in the morning.  Have added PT/INR, urinalysis, UDS, chest x-ray and EKG for preop purposes.  Otherwise, is cleared for surgery.  We will keep NPO.  As needed pain control and antiemetics.  Encouraged tobacco cessation.  PT and OT evaluations.  VTE prophylaxis.    Patient otherwise meets observation level care anticipate less than 2 midnight stay.  Further recommendations depend upon clinical course.  Plan of care discussed with patient at bedside.    Risk Assessment: Moderate  DVT Prophylaxis: Lovenox  Code Status: Full  Diet: N.p.o.    Advance Care Planning   ACP discussion was held with the patient during this visit. Patient does not have an advance directive, declines further assistance.           Dorita Downing DO  09/27/23  03:57 EDT    Dictated utilizing Dragon dictation.    Electronically signed by Dorita Downing DO at 09/27/23 0405       Emergency Department Notes    No notes of this type exist for this encounter.       Vital Signs (last day)       Date/Time Temp Temp src Pulse Resp BP Patient Position SpO2    09/27/23 1006 -- -- 77 18 101/65 Lying 100    09/27/23 0300 97.3 (36.3) Temporal 89 18 109/77 Sitting 97    09/27/23 0130 -- -- 85 18 110/62 -- 100    09/27/23 0030 -- -- 90 18 102/66 -- 98    09/26/23 2300 -- -- -- -- 105/73 --  98    09/26/23 22:29:45 -- -- 86 16 112/72 Sitting 97    09/26/23 2003 98 (36.7) Oral 105 18 122/88 Sitting 98          Current Facility-Administered Medications   Medication Dose Route Frequency Provider Last Rate Last Admin    [MAR Hold] acetaminophen (TYLENOL) tablet 650 mg  650 mg Oral Q4H PRN Dorita Downing DO        [MAR Hold] aluminum-magnesium hydroxide-simethicone (MAALOX MAX) 400-400-40 MG/5ML suspension 15 mL  15 mL Oral Q6H PRN Dorita Downing DO        [MAR Hold] sennosides-docusate (PERICOLACE) 8.6-50 MG per tablet 2 tablet  2 tablet Oral BID Dorita Downing DO        And    [MAR Hold] polyethylene glycol (MIRALAX) packet 17 g  17 g Oral Daily PRN Dorita Downing DO        And    [MAR Hold] bisacodyl (DULCOLAX) EC tablet 5 mg  5 mg Oral Daily PRN Dorita Downing DO        And    [MAR Hold] bisacodyl (DULCOLAX) suppository 10 mg  10 mg Rectal Daily PRN Dorita Downing DO        [MAR Hold] Calcium Replacement - Follow Nurse / BPA Driven Protocol   Does not apply PRDorita Belcher DO        ceFAZolin Sodium-Dextrose (ANCEF) IVPB (duplex) 2 g  2 g Intravenous Once Brian Josue MD        [MAR Hold] Enoxaparin Sodium (LOVENOX) syringe 40 mg  40 mg Subcutaneous Q24H Dorita Downing DO   40 mg at 09/27/23 0437    [MAR Hold] HYDROcodone-acetaminophen (NORCO) 5-325 MG per tablet 1 tablet  1 tablet Oral Q4H PRN Dorita Downing DO   1 tablet at 09/27/23 0437    ketorolac (TORADOL) injection 15 mg  15 mg Intravenous Q6H PRN Dorita Downing DO        [MAR Hold] Magnesium Standard Dose Replacement - Follow Nurse / BPA Driven Protocol   Does not apply PRDorita Belcher DO        [MAR Hold] melatonin tablet 5 mg  5 mg Oral Nightly PRN Dorita Downing DO        [MAR Hold] Morphine sulfate (PF) injection 2 mg  2 mg Intravenous Q4H PRN Brian Josue MD   2 mg at 09/27/23 0921    nicotine (NICODERM CQ) 14 MG/24HR  patch 1 patch  1 patch Transdermal Q24H Dorita Downing, DO   1 patch at 09/27/23 0020    [MAR Hold] ondansetron (ZOFRAN) tablet 4 mg  4 mg Oral Q6H PRN Dorita Downing, DO        Or    [MAR Hold] ondansetron (ZOFRAN) injection 4 mg  4 mg Intravenous Q6H PRN Dorita Downingus, DO        Pharmacy to Dose enoxaparin (LOVENOX)   Does not apply Continuous PRN Dorita Downingus, DO        [MAR Hold] Phosphorus Replacement - Follow Nurse / BPA Driven Protocol   Does not apply PRN Dorita Downing Randall, DO        Potassium Replacement - Follow Nurse / BPA Driven Protocol   Does not apply PRN Dorita Downing Randall, DO        sodium chloride 0.9 % flush 10 mL  10 mL Intravenous PRN Dorita Downing Randall, DO        sodium chloride 0.9 % flush 10 mL  10 mL Intravenous PRN KarDorita priest Randall, DO        [MAR Hold] sodium chloride 0.9 % flush 10 mL  10 mL Intravenous Q12H Dorita Downing Randall, DO   10 mL at 09/27/23 0921    [MAR Hold] sodium chloride 0.9 % flush 10 mL  10 mL Intravenous PRN Karcem, Dorita Randall, DO        [MAR Hold] sodium chloride 0.9 % infusion 40 mL  40 mL Intravenous PRN Dorita Downing Randall, DO        sodium chloride 0.9 % infusion  100 mL/hr Intravenous Continuous Dorita Downing Randall,  mL/hr at 09/27/23 0921 100 mL/hr at 09/27/23 0921     Facility-Administered Medications Ordered in Other Encounters   Medication Dose Route Frequency Provider Last Rate Last Admin    lactated ringers infusion   Intravenous Continuous PRN Nba Rachel CRNA   1,000 mL at 09/27/23 1003     Lab Results (last 24 hours)       Procedure Component Value Units Date/Time    POC Glucose Once [[id number]]  (Normal) Collected: 09/27/23 0824    Specimen: Blood Updated: 09/27/23 0837     Glucose 100 mg/dL      Comment: Serial Number: [id number]:  [id number]       Urine Culture - Urine, Urine, Clean Catch [[id number]] Collected: 09/27/23 0329    Specimen: Urine, Clean Catch Updated:  09/27/23 0358    Fentanyl, Urine - Urine, Clean Catch [[id number]]  (Normal) Collected: 09/27/23 0329    Specimen: Urine, Clean Catch Updated: 09/27/23 0355     Fentanyl, Urine Negative    Narrative:      Negative Threshold:      Fentanyl 5 ng/mL     The normal value for the drug tested is negative. This report includes final unconfirmed screening results to be used for medical treatment purposes only. Unconfirmed results must not be used for non-medical purposes such as employment or legal testing. Clinical consideration should be applied to any drug of abuse test, particularly when unconfirmed results are used.           Urine Drug Screen - Urine, Clean Catch [[id number]]  (Abnormal) Collected: 09/27/23 0329    Specimen: Urine, Clean Catch Updated: 09/27/23 0353     THC, Screen, Urine Positive     Phencyclidine (PCP), Urine Negative     Cocaine Screen, Urine Negative     Methamphetamine, Ur Positive     Opiate Screen Negative     Amphetamine Screen, Urine Positive     Benzodiazepine Screen, Urine Negative     Tricyclic Antidepressants Screen Negative     Methadone Screen, Urine Negative     Barbiturates Screen, Urine Negative     Oxycodone Screen, Urine Negative     Propoxyphene Screen Negative     Buprenorphine, Screen, Urine Negative    Narrative:      Limitations of this procedure include the possibility of false positives due to interfering substances in the urine sample. Clinical data should be correlated with any questionable result. Positive results should be considered Presumptive Positive until results are confirmed with another methodology such as HPLC or GCMS.    Urinalysis, Microscopic Only - Urine, Clean Catch [[id number]]  (Abnormal) Collected: 09/27/23 0329    Specimen: Urine, Clean Catch Updated: 09/27/23 0351     RBC, UA None Seen /HPF      WBC, UA 3-5 /HPF      Bacteria, UA 3+ /HPF      Squamous Epithelial Cells, UA 0-2 /HPF      Hyaline Casts, UA None Seen /LPF      Methodology Manual Light  Microscopy    Urinalysis With Microscopic If Indicated (No Culture) - Urine, Clean Catch [[id number]]  (Abnormal) Collected: 09/27/23 0329    Specimen: Urine, Clean Catch Updated: 09/27/23 0346     Color, UA Yellow     Appearance, UA Clear     pH, UA 5.5     Specific Gravity, UA 1.020     Glucose, UA Negative     Ketones, UA Trace     Bilirubin, UA Negative     Blood, UA Negative     Protein, UA Negative     Leuk Esterase, UA Small (1+)     Nitrite, UA Positive     Urobilinogen, UA 0.2 E.U./dL    Protime-INR [[id number]]  (Abnormal) Collected: 09/26/23 2056    Specimen: Blood Updated: 09/27/23 0310     Protime 12.5 Seconds      INR 0.89    Narrative:      Suggested INR therapeutic range for stable oral anticoagulant therapy:    Low Intensity therapy:   1.5-2.0  Moderate Intensity therapy:   2.0-3.0  High Intensity therapy:   2.5-4.0    [place] Draw [[id number]] Collected: 09/26/23 2056    Specimen: Blood Updated: 09/26/23 2201    Narrative:      The following orders were created for panel order [place] Draw.  Procedure                               Abnormality         Status                     ---------                               -----------         ------                     Green Top (Gel)[[id number]]                                  Final result               Lavender Top[[id number]]                                     Final result               Gold Top - SST[[id number]]                                   Final result               Light Blue Top[[id number]]                                   Final result                 Please view results for these tests on the individual orders.    Green Top (Gel) [[id number]] Collected: 09/26/23 2056    Specimen: Blood Updated: 09/26/23 2201     Extra Tube Hold for add-ons.     Comment: Auto resulted.       Lavender Top [[id number]] Collected: 09/26/23 2056    Specimen: Blood Updated: 09/26/23 2201     Extra Tube hold for add-on     Comment: Auto resulted       Gold Top - SST  [[id number]] Collected: 09/26/23 2056    Specimen: Blood Updated: 09/26/23 2201     Extra Tube Hold for add-ons.     Comment: Auto resulted.       Light Blue Top [[id number]] Collected: 09/26/23 2056    Specimen: Blood Updated: 09/26/23 2201     Extra Tube Hold for add-ons.     Comment: Auto resulted       Comprehensive Metabolic Panel [[id number]]  (Abnormal) Collected: 09/26/23 2056    Specimen: Blood Updated: 09/26/23 2120     Glucose 80 mg/dL      BUN 12 mg/dL      Creatinine 0.71 mg/dL      Sodium 140 mmol/L      Potassium 4.1 mmol/L      Chloride 102 mmol/L      CO2 24.8 mmol/L      Calcium 10.1 mg/dL      Total Protein 8.0 g/dL      Albumin 4.6 g/dL      ALT (SGPT) 31 U/L      AST (SGOT) 25 U/L      Alkaline Phosphatase 167 U/L      Total Bilirubin 0.2 mg/dL      Globulin 3.4 gm/dL      A/G Ratio 1.4 g/dL      BUN/Creatinine Ratio 16.9     Anion Gap 13.2 mmol/L      eGFR 101.8 mL/min/1.73     Narrative:      GFR Normal >60  Chronic Kidney Disease <60  Kidney Failure <15      CBC & Differential [[id number]]  (Normal) Collected: 09/26/23 2056    Specimen: Blood Updated: 09/26/23 2104    Narrative:      The following orders were created for panel order CBC & Differential.  Procedure                               Abnormality         Status                     ---------                               -----------         ------                     CBC Auto Differential[[id number]]        Normal              Final result                 Please view results for these tests on the individual orders.    CBC Auto Differential [[id number]]  (Normal) Collected: 09/26/23 2056    Specimen: Blood Updated: 09/26/23 2104     WBC 8.65 10*3/mm3      RBC 5.11 10*6/mm3      Hemoglobin 14.7 g/dL      Hematocrit 44.8 %      MCV 87.7 fL      MCH 28.8 pg      MCHC 32.8 g/dL      RDW 12.6 %      RDW-SD 40.6 fl      MPV 8.8 fL      Platelets 426 10*3/mm3      Neutrophil % 61.2 %      Lymphocyte % 32.0 %      Monocyte % 5.1 %      Eosinophil %  0.8 %      Basophil % 0.7 %      Immature Grans % 0.2 %      Neutrophils, Absolute 5.29 10*3/mm3      Lymphocytes, Absolute 2.77 10*3/mm3      Monocytes, Absolute 0.44 10*3/mm3      Eosinophils, Absolute 0.07 10*3/mm3      Basophils, Absolute 0.06 10*3/mm3      Immature Grans, Absolute 0.02 10*3/mm3      nRBC 0.0 /100 WBC           Imaging Results (Last 24 Hours)       Procedure Component Value Units Date/Time    XR Chest 1 View [[id number]] Collected: 09/27/23 0902     Updated: 09/27/23 0905    Narrative:      PROCEDURE: XR CHEST 1 VW-     HISTORY: preop for surgery, smoking copd; S82.892A-Other fracture of  left lower leg, initial encounter for closed fracture     COMPARISON: September 27, 2023.     FINDINGS: The heart is normal in size. There is mild interstitial  disease similar to prior exam. Calcified granulomas noted. Remote right  posterior rib fracture again noted. Bronchial wall thickening noted  suggesting bronchitis.. The mediastinum is unremarkable. There is no  pneumothorax.  There are no acute osseous abnormalities.       Impression:      No acute infiltrate seen..                 This report was signed and finalized on 9/27/2023 9:03 AM by Ketty Frey MD.       XR Ankle 3+ View Left [[id number]] Resulted: 09/27/23 0821     Updated: 09/27/23 0843    XR Chest 1 View [[id number]] Resulted: 09/27/23 0532     Updated: 09/27/23 0533          Operative/Procedure Notes (last 24 hours)  Notes from 09/26/23 1040 through 09/27/23 1040   No notes of this type exist for this encounter.       Physician Progress Notes (last 24 hours)  Notes from 09/26/23 1040 through 09/27/23 1040   No notes of this type exist for this encounter.          Consult Notes (last 24 hours)        Brian Josue MD at 09/27/23 0804              Patient Care Team:  Provider, No Known as PCP - General  Past Medical History:   Diagnosis Date    Acid reflux     Anemia     Asthma     COPD (chronic obstructive pulmonary disease)      Fibromyalgia, primary     Fractures     Low back pain     Neck pain     Ovarian cyst     Shoulder pain      Past Surgical History:   Procedure Laterality Date    CHOLECYSTECTOMY      COSMETIC SURGERY      HYSTERECTOMY      MOUTH SURGERY      TUBAL ABDOMINAL LIGATION       Family History   Problem Relation Age of Onset    Cancer Mother     Diabetes Mother     Heart disease Mother     Cancer Father     Diabetes Father     Heart disease Father     Arthritis Father     Asthma Father      Social History     Tobacco Use    Smoking status: Every Day     Packs/day: 0.25     Types: Cigarettes   Vaping Use    Vaping Use: Never used   Substance Use Topics    Alcohol use: No    Drug use: Yes     Types: Marijuana     Medications Prior to Admission   Medication Sig Dispense Refill Last Dose    acetaminophen (TYLENOL) 650 MG 8 hr tablet Take 1 tablet by mouth Every 8 (Eight) Hours As Needed for Mild Pain.   9/26/2023     Allergies:  Baclofen and Morphine and related  Chief complaint left ankle fracture    Subjective     Patient is a 52 y.o. female presents with .  Left ankle pain she sustained a fall few weeks ago she had been placed in a cast to allow for soft tissue swelling to subside.  She has been noncompliant with her follow-up and she presented back to the ER last evening and we admitted her for surgical intervention today.  She has a intra-articular displaced distal tibia fracture    Review of Systems   Pertinent items are noted in HPI    History  Past Medical History:   Diagnosis Date    Acid reflux     Anemia     Asthma     COPD (chronic obstructive pulmonary disease)     Fibromyalgia, primary     Fractures     Low back pain     Neck pain     Ovarian cyst     Shoulder pain      Past Surgical History:   Procedure Laterality Date    CHOLECYSTECTOMY      COSMETIC SURGERY      HYSTERECTOMY      MOUTH SURGERY      TUBAL ABDOMINAL LIGATION       Family History   Problem Relation Age of Onset    Cancer Mother     Diabetes  Mother     Heart disease Mother     Cancer Father     Diabetes Father     Heart disease Father     Arthritis Father     Asthma Father      Social History     Tobacco Use    Smoking status: Every Day     Packs/day: 0.25     Types: Cigarettes   Vaping Use    Vaping Use: Never used   Substance Use Topics    Alcohol use: No    Drug use: Yes     Types: Marijuana     Medications Prior to Admission   Medication Sig Dispense Refill Last Dose    acetaminophen (TYLENOL) 650 MG 8 hr tablet Take 1 tablet by mouth Every 8 (Eight) Hours As Needed for Mild Pain.   9/26/2023     Allergies:  Baclofen and Morphine and related    Objective     Vital Signs  Temp:  [97.3 °F (36.3 °C)-98 °F (36.7 °C)] 97.3 °F (36.3 °C)  Heart Rate:  [] 89  Resp:  [16-18] 18  BP: (102-122)/(62-88) 109/77    Physical Exam:      General Appearance:    Alert, cooperative, in no acute distress   Head:    Normocephalic, without obvious abnormality, atraumatic   Eyes:            Lids and lashes normal, conjunctivae and sclerae normal, no   icterus, no pallor, corneas clear, PERRLA   Ears:    Ears appear intact with no abnormalities noted   Throat:   No oral lesions, no thrush, oral mucosa moist   Neck:   No adenopathy, supple, trachea midline, no thyromegaly, no     carotid bruit, no JVD   Back:     No kyphosis present, no scoliosis present, no skin lesions,       erythema or scars, no tenderness to percussion or                   palpation,   range of motion normal   Lungs:     Clear to auscultation,respirations regular, even and                   unlabored    Heart:    Regular rhythm and normal rate, normal S1 and S2, no            murmur, no gallop, no rub, no click   Breast Exam:    Deferred   Abdomen:     Normal bowel sounds, no masses, no organomegaly, soft        non-tender, non-distended, no guarding, no rebound                 tenderness   Genitalia:    Deferred   Extremities: She has a cast in place in her left lower extremity she can wiggle her  toes she has brisk cap refill   Pulses:   Pulses palpable and equal bilaterally   Skin:   No bleeding, bruising or rash   Lymph nodes:   No palpable adenopathy   Neurologic:   Cranial nerves 2 - 12 grossly intact, sensation intact, DTR        present and equal bilaterally       Results Review:    I reviewed the patient's new clinical results.    Assessment & Plan       Ankle fracture      Plan is for operative intervention today for open reduction internal fixation of left ankle fracture.  She understands that she understands the risk of infection stiffness malunion nonunion arthritis risk with anesthesia loss of life and limb and desires to proceed.  We will repeat her x-rays this morning    I discussed the patients findings and my recommendations with patient.     Brian Josue MD  09/27/23  08:04 EDT          Electronically signed by Brian Josue MD at 09/27/23 0806

## 2023-09-27 NOTE — ANESTHESIA PROCEDURE NOTES
Airway  Urgency: elective    Date/Time: 9/27/2023 11:44 AM  Airway not difficult    General Information and Staff    Patient location during procedure: OR  CRNA/CAA: Nba Rachel CRNA    Indications and Patient Condition    Preoxygenated: yes  Mask difficulty assessment: 1 - vent by mask    Final Airway Details  Final airway type: supraglottic airway      Successful airway: classic     Number of attempts at approach: 1  Assessment: lips, teeth, and gum same as pre-op and atraumatic intubation    Additional Comments  lma placed by standard fashion, cuff up with mov, bbs and expansion =, + etco, tolerated without adverse rx. Syringe to  balloon for cuff pressure equalization, gauge in the green zone

## 2023-09-28 ENCOUNTER — APPOINTMENT (OUTPATIENT)
Dept: GENERAL RADIOLOGY | Facility: HOSPITAL | Age: 53
End: 2023-09-28
Payer: COMMERCIAL

## 2023-09-28 ENCOUNTER — HOSPITAL ENCOUNTER (EMERGENCY)
Facility: HOSPITAL | Age: 53
Discharge: HOME | End: 2023-09-28
Attending: EMERGENCY MEDICINE
Payer: COMMERCIAL

## 2023-09-28 VITALS
WEIGHT: 150 LBS | SYSTOLIC BLOOD PRESSURE: 138 MMHG | RESPIRATION RATE: 16 BRPM | TEMPERATURE: 98.4 F | OXYGEN SATURATION: 100 % | HEART RATE: 82 BPM | BODY MASS INDEX: 24.11 KG/M2 | DIASTOLIC BLOOD PRESSURE: 86 MMHG | HEIGHT: 66 IN

## 2023-09-28 DIAGNOSIS — G89.18 POST-OP PAIN: Primary | ICD-10-CM

## 2023-09-28 PROCEDURE — 73590 X-RAY EXAM OF LOWER LEG: CPT

## 2023-09-28 PROCEDURE — 99282 EMERGENCY DEPT VISIT SF MDM: CPT

## 2023-09-28 NOTE — PAYOR COMM NOTE
"To:  [employer]  From: Laxmi Arias RN  Phone: [phone]  Fax: [phone]  NPI: [id number]  TIN: [id number]  Member ID: [id number]   MRN: [id number]    Rashida Valles (52 y.o. Female)       Date of Birth   1970    Social Security Number       Address   [address] [place] [address]    Home Phone   [phone]    MRN   [id number]       Yazidism   None    Marital Status                               Admission Date   23    Admission Type   Emergency    Admitting Provider   Dorita Downing DO    Attending Provider       Department, Room/Bed   [facility] TELEMETRY SDS OVERFLOW, T05       Discharge Date   2023    Discharge Disposition   Home or Self Care    Discharge Destination                                 Attending Provider: (none)   Allergies: Baclofen, Morphine And Related    Isolation: None   Infection: None   Code Status: Prior    Ht: 167.6 cm (66\")   Wt: 68 kg (150 lb)    Admission Cmt: None   Principal Problem: Ankle fracture [S82.899A]                   Active Insurance as of 2023       Primary Coverage       Payor Plan Insurance Group Employer/Plan Group    [facility] MEDICAID        Payor Plan Address Payor Plan Phone Number Payor Plan Fax Number Effective Dates    [address] [address] [phone]  2019 - None Entered    [facility] [id number]         Subscriber Name Subscriber Birth Date Member ID       RASHIDA VALLES 1970 [id number]                     Emergency Contacts            No emergency contacts on file.                 Physician Progress Notes (last 24 hours)        Irene Valverde APRN at 23 1010              [facility] HOSPITALIST    PROGRESS NOTE    Name:  Rashida Valles   Age:  52 y.o.  Sex:  female  :  1970  MRN:  [id number]   Visit Number:  [id number]  Admission Date:  2023  Date Of Service:  23  Primary Care Physician:  Provider, No Known     LOS: 0 " days :    Chief Complaint:      Left ankle fracture    Subjective:    Patient seen and examined this morning at bedside [facility].  Patient advised she is currently homeless and living on the streets.  She is agreeable to surgery today.  She is pleasant in conversation.  States that she enjoys smoking marijuana regularly.      Hospital Course:    The patient is a 52-year-old female with a history of homelessness, chronic tobacco abuse, COPD, fibromyalgia, polysubstance use, who presented to the emergency room with a history of a left ankle fracture.  History obtained from patient and ER record.  She states that she fell about 3 weeks ago going into a store to buy cigarettes, had immediate pain and cracking of her left ankle at that time.  She was diagnosed with ankle fracture, had orthopedic follow-up and was placed in a cast.  She was due to have operative intervention, however per Dr. Josue, has had issues with following up and arranging surgery.  Apparently his office had notified her to plan on surgery on 9/27/2023 and to come to the hospital if she could.  She currently is only complaining of pain of the leg.  She notes she has been walking somewhat on the cast and did have another fall about 2 days ago.  She notes she falls quite a bit and uses a wheelchair a lot.  She smokes about a pack of cigarettes every 2 days, down from 3 packs a day.     In the ER, she was hemodynamically stable.  Initial labs were unremarkable.  ER provider had talked with Dr. Paulino who requested patient be admitted to the hospitalist service with plan for surgery in the morning.  Was asked to admit thereafter    Review of Systems:     All systems were reviewed and negative except as mentioned in subjective, assessment and plan.    Vital Signs:    Temp:  [97.3 °F (36.3 °C)-98 °F (36.7 °C)] 97.8 °F (36.6 °C)  Heart Rate:  [] 78  Resp:  [14-18] 16  BP: ()/(57-88) 96/65    Intake and output:    I/O last 3 completed  shifts:  In: 420 [I.V.:420]  Out: 300 [Urine:300]  No intake/output data recorded.    Physical Examination:      General Appearance:  Alert and cooperative, pleasant middle aged female, no acute distress on exam   Head:  Atraumatic and normocephalic.   Eyes: Conjunctivae and sclerae normal, no icterus. No pallor.   Throat: No oral lesions, no thrush, oral mucosa moist.   Neck: Supple, trachea midline   Lungs:   Breath sounds heard bilaterally equally.  No wheezing or crackles. Unlabored on exam   Heart:  Normal S1 and S2, no murmur, no gallop, no rub. No JVD.   Abdomen:   Normal bowel sounds, no masses, no organomegaly. Soft, nontender, nondistended, no rebound tenderness.   Extremities: Supple, no edema, no cyanosis, no clubbing, cast to left lower extremity   Skin: No bleeding or rash   Neurologic: Alert and oriented x 3. No facial asymmetry. Moves all four limbs. No tremors.      Laboratory results:    Results from last 7 days   Lab Units 09/26/23 2056   SODIUM mmol/L 140   POTASSIUM mmol/L 4.1   CHLORIDE mmol/L 102   CO2 mmol/L 24.8   BUN mg/dL 12   CREATININE mg/dL 0.71   CALCIUM mg/dL 10.1   BILIRUBIN mg/dL 0.2   ALK PHOS U/L 167*   ALT (SGPT) U/L 31   AST (SGOT) U/L 25   GLUCOSE mg/dL 80     Results from last 7 days   Lab Units 09/26/23 2056   WBC 10*3/mm3 8.65   HEMOGLOBIN g/dL 14.7   HEMATOCRIT % 44.8   PLATELETS 10*3/mm3 426     Results from last 7 days   Lab Units 09/26/23 2056   INR  0.89*             No results for input(s): PHART, FJJ7PZC, PO2ART, FJM3FSS, BASEEXCESS in the last 8760 hours.   I have reviewed the patient's laboratory results.    Radiology results:    XR Ankle 3+ View Left    Result Date: 9/27/2023  PROCEDURE: XR ANKLE 3+ VW LEFT-  History: fracture follow up; S82.892A-Other fracture of left lower leg, initial encounter for closed fracture  COMPARISON: September 5, 2023.  FINDINGS:  A 3 view exam was obtained. There is loss of bony detail due to overlying cast material. The anterior  and posterior distal tibial fractures show increased separation of the fracture fragments. There is some periosteal reaction anteriorly and medially. Cannot evaluate for effusion.      Impression: Increased separation of fracture fragments of the anterior and posterior distal tibial fractures.       Images were reviewed, interpreted, and dictated by Dr. Ketty Frey MD Transcribed by Tg Martell PA-C.      XR Chest 1 View    Result Date: 9/27/2023  PROCEDURE: XR CHEST 1 VW-  HISTORY: preop for surgery, smoking copd; S82.892A-Other fracture of left lower leg, initial encounter for closed fracture  COMPARISON: September 27, 2023.  FINDINGS: The heart is normal in size. There is mild interstitial disease similar to prior exam. Calcified granulomas noted. Remote right posterior rib fracture again noted. Bronchial wall thickening noted suggesting bronchitis.. The mediastinum is unremarkable. There is no pneumothorax.  There are no acute osseous abnormalities.      Impression: No acute infiltrate seen..      This report was signed and finalized on 9/27/2023 9:03 AM by Ketty Frey MD.      Peripheral Block    Result Date: 9/27/2023  Nba Rachel CRNA     9/27/2023 10:47 AM Peripheral Block Pre-sedation assessment completed: 9/27/2023 12:23 PM Patient reassessed immediately prior to procedure Patient location during procedure: pre-op Start time: 9/27/2023 10:22 AM Stop time: 9/27/2023 10:44 AM Reason for block: at surgeon's request and post-op pain management Performed by DESTINY/CAA: Nba Rachel CRNA Preanesthetic Checklist Completed: patient identified, IV checked, site marked, risks and benefits discussed, surgical consent, monitors and equipment checked, pre-op evaluation and timeout performed Prep: Pt Position: supine Sterile barriers:alcohol skin prep, gloves, mask, cap and washed/disinfected hands Prep: ChloraPrep and air dry 3 min per clock Procedure Sedation: yes Performed under: MAC  Guidance:ultrasound guided ULTRASOUND INTERPRETATION.  Using ultrasound guidance a 21 G gauge needle was placed in close proximity to the nerve, at which point, under ultrasound guidance anesthetic was injected in the area of the nerve and spread of the anesthesia was seen on ultrasound in close proximity thereto.  There were no abnormalities seen on ultrasound; a digital image was taken; and the patient tolerated the procedure with no complications. Images:still images obtained, printed/placed on chart Laterality:left Block Type:adductor canal block and popliteal Injection Technique:single-shot Needle Type:echogenic and short-bevel Needle Gauge:21 G Resistance on Injection: none Medications Comment:Pop block marcaine 0.5% 10 ml with lidocaine 2% 10 ml with decadron 10 mg Adductor canal block 0.25% 10 ml Post Assessment Injection Assessment: negative aspiration for heme, no paresthesia on injection and incremental injection Patient Tolerance:comfortable throughout block Complications:no     FL C Arm During Surgery    Result Date: 9/27/2023  This procedure was auto-finalized with no dictation required.   I have reviewed the patient's radiology reports.    Medication Review:     I have reviewed the patient's active and prn medications.     Problem List:      Ankle fracture      Assessment:    Recent comminuted, intra-articular distal tibia fracture with anterior subluxation of the talus, left  Tiny avulsion fracture from distal fibula  Chronic tobacco abuse  Polysubstance use  Hypertension  Homelessness  Impaired mobility and ADL    Plan:    Ankle fracture:  - Orthopedics requested admission for surgery--Liat taking to OR today   -NPO pending surgery.    -As needed pain control and antiemetics.    -Encouraged tobacco cessation.    -PT and OT evaluations post-op    -VTE prophylaxis with Lovenox    Chronic  -Complicates all aspects of care  -Case management consulted for discharge planning    DVT Prophylaxis:  Lovenox  Code Status: Full code  Diet: NPO  Discharge Plan: Pending    TJ Arciniega  09/27/23  12:53 EDT    Dictated utilizing Dragon dictation.      Electronically signed by Irene Valverde APRN at 09/27/23 1304          Consult Notes (last 24 hours)        Brian Josue MD at 09/27/23 0804              Patient Care Team:  Provider, No Known as PCP - General  Past Medical History:   Diagnosis Date    Acid reflux     Anemia     Asthma     COPD (chronic obstructive pulmonary disease)     Fibromyalgia, primary     Fractures     Low back pain     Neck pain     Ovarian cyst     Shoulder pain      Past Surgical History:   Procedure Laterality Date    CHOLECYSTECTOMY      COSMETIC SURGERY      HYSTERECTOMY      MOUTH SURGERY      TUBAL ABDOMINAL LIGATION       Family History   Problem Relation Age of Onset    Cancer Mother     Diabetes Mother     Heart disease Mother     Cancer Father     Diabetes Father     Heart disease Father     Arthritis Father     Asthma Father      Social History     Tobacco Use    Smoking status: Every Day     Packs/day: 0.25     Types: Cigarettes   Vaping Use    Vaping Use: Never used   Substance Use Topics    Alcohol use: No    Drug use: Yes     Types: Marijuana     Medications Prior to Admission   Medication Sig Dispense Refill Last Dose    acetaminophen (TYLENOL) 650 MG 8 hr tablet Take 1 tablet by mouth Every 8 (Eight) Hours As Needed for Mild Pain.   9/26/2023     Allergies:  Baclofen and Morphine and related  Chief complaint left ankle fracture    Subjective     Patient is a 52 y.o. female presents with .  Left ankle pain she sustained a fall few weeks ago she had been placed in a cast to allow for soft tissue swelling to subside.  She has been noncompliant with her follow-up and she presented back to the ER last evening and we admitted her for surgical intervention today.  She has a intra-articular displaced distal tibia fracture    Review of Systems   Pertinent items are  noted in HPI    History  Past Medical History:   Diagnosis Date    Acid reflux     Anemia     Asthma     COPD (chronic obstructive pulmonary disease)     Fibromyalgia, primary     Fractures     Low back pain     Neck pain     Ovarian cyst     Shoulder pain      Past Surgical History:   Procedure Laterality Date    CHOLECYSTECTOMY      COSMETIC SURGERY      HYSTERECTOMY      MOUTH SURGERY      TUBAL ABDOMINAL LIGATION       Family History   Problem Relation Age of Onset    Cancer Mother     Diabetes Mother     Heart disease Mother     Cancer Father     Diabetes Father     Heart disease Father     Arthritis Father     Asthma Father      Social History     Tobacco Use    Smoking status: Every Day     Packs/day: 0.25     Types: Cigarettes   Vaping Use    Vaping Use: Never used   Substance Use Topics    Alcohol use: No    Drug use: Yes     Types: Marijuana     Medications Prior to Admission   Medication Sig Dispense Refill Last Dose    acetaminophen (TYLENOL) 650 MG 8 hr tablet Take 1 tablet by mouth Every 8 (Eight) Hours As Needed for Mild Pain.   9/26/2023     Allergies:  Baclofen and Morphine and related    Objective     Vital Signs  Temp:  [97.3 °F (36.3 °C)-98 °F (36.7 °C)] 97.3 °F (36.3 °C)  Heart Rate:  [] 89  Resp:  [16-18] 18  BP: (102-122)/(62-88) 109/77    Physical Exam:      General Appearance:    Alert, cooperative, in no acute distress   Head:    Normocephalic, without obvious abnormality, atraumatic   Eyes:            Lids and lashes normal, conjunctivae and sclerae normal, no   icterus, no pallor, corneas clear, PERRLA   Ears:    Ears appear intact with no abnormalities noted   Throat:   No oral lesions, no thrush, oral mucosa moist   Neck:   No adenopathy, supple, trachea midline, no thyromegaly, no     carotid bruit, no JVD   Back:     No kyphosis present, no scoliosis present, no skin lesions,       erythema or scars, no tenderness to percussion or                   palpation,   range of motion  "normal   Lungs:     Clear to auscultation,respirations regular, even and                   unlabored    Heart:    Regular rhythm and normal rate, normal S1 and S2, no            murmur, no gallop, no rub, no click   Breast Exam:    Deferred   Abdomen:     Normal bowel sounds, no masses, no organomegaly, soft        non-tender, non-distended, no guarding, no rebound                 tenderness   Genitalia:    Deferred   Extremities: She has a cast in place in her left lower extremity she can wiggle her toes she has brisk cap refill   Pulses:   Pulses palpable and equal bilaterally   Skin:   No bleeding, bruising or rash   Lymph nodes:   No palpable adenopathy   Neurologic:   Cranial nerves 2 - 12 grossly intact, sensation intact, DTR        present and equal bilaterally       Results Review:    I reviewed the patient's new clinical results.    Assessment & Plan       Ankle fracture      Plan is for operative intervention today for open reduction internal fixation of left ankle fracture.  She understands that she understands the risk of infection stiffness malunion nonunion arthritis risk with anesthesia loss of life and limb and desires to proceed.  We will repeat her x-rays this morning    I discussed the patients findings and my recommendations with patient.     Brian Josue MD  09/27/23  08:04 EDT          Electronically signed by Brian Josue MD at 09/27/23 0806     Vivien Granado, RN   Registered Nurse  Nursing     Nursing Note      Signed     Date of Service: 09/27/23 1909  Creation Time: 09/27/23 1915     Signed       Summary: Left AMA    Pt's boyfriend came to visit, Pt decided to leave, states she has to smoke, \"doesn't want to go to rehab if they're gonna take my check\", wishes to leave. I advised on risks of leaving and benefits of staying, offered a nicotine patch and reminded Pt of pain management interventions available. Educated risk of infection, s/s of infection and encouraged Pt to f/u " with primary care or surgeon d/t surgery today

## 2023-09-29 LAB — BACTERIA SPEC AEROBE CULT: ABNORMAL

## 2023-09-29 NOTE — PROGRESS NOTES
9/29/2023--Attempted to call patient with urine culture results and needed antibiotics with no answer.  Will attempt again tomorrow.    Electronically signed by TJ Arciniega, 09/29/23, 4:10 PM EDT.

## 2023-09-29 NOTE — DISCHARGE SUMMARY
[facility]   DISCHARGE SUMMARY      Name:  Rashida Valles   Age:  52 y.o.  Sex:  female  :  1970  MRN:  [id number]   Visit Number:  [id number]    Admission Date:  2023  Date of Discharge:  2023  Primary Care Physician:  Provider, No Known    Important issues to note:    Patient had surgery with Dr. Josue to repair ankle fracture and signed out AMA post-operatively.    Discharge Diagnoses:     Recent comminuted, intra-articular distal tibia fracture with anterior subluxation of the talus, left  Tiny avulsion fracture from distal fibula  Chronic tobacco abuse  Polysubstance use  Hypertension  Homelessness  Impaired mobility and AD    Problem List:     Active Hospital Problems    Diagnosis  POA    **Ankle fracture [S82.899A]  Yes      Resolved Hospital Problems   No resolved problems to display.     Presenting Problem:    Chief Complaint   Patient presents with    Leg Pain      Consults:     Consulting Physician(s)               None            Procedures Performed:    Procedure(s):  ANKLE OPEN REDUCTION INTERNAL FIXATION    History of presenting illness/Hospital Course:    The patient is a 52-year-old female with a history of homelessness, chronic tobacco abuse, COPD, fibromyalgia, polysubstance use, who presented to the emergency room with a history of a left ankle fracture.  History obtained from patient and ER record.  She states that she fell about 3 weeks ago going into a store to buy cigarettes, had immediate pain and cracking of her left ankle at that time.  She was diagnosed with ankle fracture, had orthopedic follow-up and was placed in a cast.  She was due to have operative intervention, however per Dr. Josue, has had issues with following up and arranging surgery.  Apparently his office had notified her to plan on surgery on 2023 and to come to the hospital if she could.  She currently is only complaining of pain of the leg.  She notes she has been  walking somewhat on the cast and did have another fall about 2 days ago.  She notes she falls quite a bit and uses a wheelchair a lot.  She smokes about a pack of cigarettes every 2 days, down from 3 packs a day.     In the ER, she was hemodynamically stable.  Initial labs were unremarkable.  ER provider had talked with Dr. Paulino who requested patient be admitted to the hospitalist service with plan for surgery in the morning.      Admitted to the hospital for further care.  Taken to OR with Dr. Josue 9/27/2023 with plans to evaluate patient post-operatively with physical and occupational therapy to see if further assistance was needed.  Patient did sign out AMA post-operatively communicated to me by the night physician.           Pertinent Lab Results:     Results from last 7 days   Lab Units 09/26/23 2056   SODIUM mmol/L 140   POTASSIUM mmol/L 4.1   CHLORIDE mmol/L 102   CO2 mmol/L 24.8   BUN mg/dL 12   CREATININE mg/dL 0.71   CALCIUM mg/dL 10.1   BILIRUBIN mg/dL 0.2   ALK PHOS U/L 167*   ALT (SGPT) U/L 31   AST (SGOT) U/L 25   GLUCOSE mg/dL 80     Results from last 7 days   Lab Units 09/26/23 2056   WBC 10*3/mm3 8.65   HEMOGLOBIN g/dL 14.7   HEMATOCRIT % 44.8   PLATELETS 10*3/mm3 426     Results from last 7 days   Lab Units 09/26/23 2056   INR  0.89*                         Results from last 7 days   Lab Units 09/27/23  0329   URINECX  >100,000 CFU/mL Escherichia coli*       Pertinent Radiology Results:    Imaging Results (All)       Procedure Component Value Units Date/Time    XR Chest 1 View [[id number]] Collected: 09/27/23 1315     Updated: 09/27/23 1328    Narrative:      PROCEDURE: XR CHEST 1 VW-        HISTORY: preop; S82.892A-Other fracture of left lower leg, initial  encounter for closed fracture     COMPARISON: December 2019.     FINDINGS: The heart is normal in size. The mediastinum is unremarkable.  There is evidence of old calcified granulomatous disease. The lungs are  otherwise clear. There is no  pneumothorax. There are no acute osseous  abnormalities.       Impression:      No acute cardiopulmonary process.                       Images were reviewed, interpreted, and dictated by Dr. Ketty Frey MD  Transcribed by Tg Martell PA-C.     This report was signed and finalized on 9/27/2023 1:26 PM by Ketty Frey MD.       XR Ankle 3+ View Left [[id number]] Collected: 09/27/23 1131     Updated: 09/27/23 1328    Narrative:      PROCEDURE: XR ANKLE 3+ VW LEFT-     History: fracture follow up; S82.892A-Other fracture of left lower leg,  initial encounter for closed fracture     COMPARISON: September 5, 2023.     FINDINGS:  A 3 view exam was obtained. There is loss of bony detail due  to overlying cast material. The anterior and posterior distal tibial  fractures show increased separation of the fracture fragments. There is  some periosteal reaction anteriorly and medially. Cannot evaluate for  effusion.       Impression:      Increased separation of fracture fragments of the anterior  and posterior distal tibial fractures.                    Images were reviewed, interpreted, and dictated by Dr. Ketty Frey MD  Transcribed by Tg Martell PA-C.     This report was signed and finalized on 9/27/2023 1:26 PM by Ketty Frey MD.       FL C Arm During Surgery [[id number]] Resulted: 09/27/23 1240     Updated: 09/27/23 1240    Narrative:      This procedure was auto-finalized with no dictation required.    XR Chest 1 View [[id number]] Collected: 09/27/23 0902     Updated: 09/27/23 0905    Narrative:      PROCEDURE: XR CHEST 1 VW-     HISTORY: preop for surgery, smoking copd; S82.892A-Other fracture of  left lower leg, initial encounter for closed fracture     COMPARISON: September 27, 2023.     FINDINGS: The heart is normal in size. There is mild interstitial  disease similar to prior exam. Calcified granulomas noted. Remote right  posterior rib fracture again noted. Bronchial wall thickening noted  suggesting  bronchitis.. The mediastinum is unremarkable. There is no  pneumothorax.  There are no acute osseous abnormalities.       Impression:      No acute infiltrate seen..                 This report was signed and finalized on 9/27/2023 9:03 AM by Ketty Frey MD.               Condition on Discharge:      Stable.    Code status during the hospital stay:    Code Status and Medical Interventions:   Ordered at: 09/27/23 0400     Code Status (Patient has no pulse and is not breathing):    CPR (Attempt to Resuscitate)     Medical Interventions (Patient has pulse or is breathing):    Full Support     Discharge Disposition:    Home or Self Care    Discharge Medications:       Discharge Medications        ASK your doctor about these medications        Instructions Start Date   acetaminophen 650 MG 8 hr tablet  Commonly known as: TYLENOL   650 mg, Oral, Every 8 Hours PRN                 No future appointments.           Irene Valverde, APRN  09/29/23  06:50 EDT    Time: I spent 15 minutes on this discharge activity which included: face-to-face encounter with the patient, reviewing the data in the system, coordination of the care with the nursing staff as well as consultants, documentation, and entering orders.     Dictated utilizing Dragon dictation.

## 2023-09-29 NOTE — ED PROVIDER NOTES
Subjective  History of Present Illness:    Chief Complaint: Left ankle pain after surgery  History of Present Illness: 52-year-old female with left ankle pain after surgery, she had surgery performed yesterday morning including screws and plates, she states that she thinks that the screws and plates have come loose  Onset: Gradual onset  Duration: 1 day after surgery  Exacerbating / Alleviating factors: Pain is worse when foot is hanging with gravity  Associated symptoms: Swelling      Nurses Notes reviewed and agree, including vitals, allergies, social history and prior medical history.     REVIEW OF SYSTEMS: All systems reviewed and not pertinent unless noted.    Review of Systems   Musculoskeletal:         Left ankle pain   All other systems reviewed and are negative.    Past Medical History:   Diagnosis Date    Acid reflux     Anemia     Asthma     COPD (chronic obstructive pulmonary disease)     Fibromyalgia, primary     Fractures     Low back pain     Neck pain     Ovarian cyst     Shoulder pain        Allergies:    Baclofen and Morphine and related      Past Surgical History:   Procedure Laterality Date    ANKLE OPEN REDUCTION INTERNAL FIXATION Left 9/27/2023    Procedure: ANKLE OPEN REDUCTION INTERNAL FIXATION;  Surgeon: Brian Josue MD;  Location: [facility];  Service: Orthopedics;  Laterality: Left;    CHOLECYSTECTOMY      COSMETIC SURGERY      HYSTERECTOMY      MOUTH SURGERY      TUBAL ABDOMINAL LIGATION           Social History     Socioeconomic History    Marital status:    Tobacco Use    Smoking status: Every Day     Packs/day: 0.25     Types: Cigarettes   Vaping Use    Vaping Use: Never used   Substance and Sexual Activity    Alcohol use: No    Drug use: Yes     Types: Marijuana    Sexual activity: Defer         Family History   Problem Relation Age of Onset    Cancer Mother     Diabetes Mother     Heart disease Mother     Cancer Father     Diabetes Father     Heart disease Father      "Arthritis Father     Asthma Father        Objective  Physical Exam:  /86   Pulse 82   Temp 98.4 °F (36.9 °C) (Oral)   Resp 16   Ht 167.6 cm (66\")   Wt 68 kg (150 lb)   SpO2 100%   BMI 24.21 kg/m²      Physical Exam  Vitals and nursing note reviewed.   Constitutional:       Appearance: She is well-developed.   Cardiovascular:      Rate and Rhythm: Normal rate and regular rhythm.   Pulmonary:      Effort: Pulmonary effort is normal.   Musculoskeletal:         General: Swelling present.      Cervical back: Normal range of motion and neck supple.      Comments: Distal cap refill normal patient does have mild swelling proximal to the cast   Skin:     General: Skin is warm and dry.   Neurological:      Mental Status: She is alert and oriented to person, place, and time.      Deep Tendon Reflexes: Reflexes are normal and symmetric.         Procedures    ED Course:         Lab Results (last 24 hours)       ** No results found for the last 24 hours. **             XR Ankle 3+ View Left    Result Date: 9/27/2023  PROCEDURE: XR ANKLE 3+ VW LEFT-  History: fracture follow up; S82.892A-Other fracture of left lower leg, initial encounter for closed fracture  COMPARISON: September 5, 2023.  FINDINGS:  A 3 view exam was obtained. There is loss of bony detail due to overlying cast material. The anterior and posterior distal tibial fractures show increased separation of the fracture fragments. There is some periosteal reaction anteriorly and medially. Cannot evaluate for effusion.      Impression: Increased separation of fracture fragments of the anterior and posterior distal tibial fractures.       Images were reviewed, interpreted, and dictated by Dr. Ketty Frey MD Transcribed by Tg Martell PA-C.  This report was signed and finalized on 9/27/2023 1:26 PM by Ketty Frey MD.      XR Chest 1 View    Result Date: 9/27/2023  PROCEDURE: XR CHEST 1 VW-    HISTORY: preop; S82.892A-Other fracture of left lower leg, " initial encounter for closed fracture  COMPARISON: December 2019.  FINDINGS: The heart is normal in size. The mediastinum is unremarkable. There is evidence of old calcified granulomatous disease. The lungs are otherwise clear. There is no pneumothorax. There are no acute osseous abnormalities.      Impression: No acute cardiopulmonary process.        Images were reviewed, interpreted, and dictated by Dr. Ketty Frey MD Transcribed by Tg Martell PA-C.  This report was signed and finalized on 9/27/2023 1:26 PM by Ketty Frey MD.      XR Chest 1 View    Result Date: 9/27/2023  PROCEDURE: XR CHEST 1 VW-  HISTORY: preop for surgery, smoking copd; S82.892A-Other fracture of left lower leg, initial encounter for closed fracture  COMPARISON: September 27, 2023.  FINDINGS: The heart is normal in size. There is mild interstitial disease similar to prior exam. Calcified granulomas noted. Remote right posterior rib fracture again noted. Bronchial wall thickening noted suggesting bronchitis.. The mediastinum is unremarkable. There is no pneumothorax.  There are no acute osseous abnormalities.      Impression: No acute infiltrate seen..      This report was signed and finalized on 9/27/2023 9:03 AM by Ketty Frey MD.      Peripheral Block    Result Date: 9/27/2023  Nba Rachel CRNA     9/27/2023 10:47 AM Peripheral Block Pre-sedation assessment completed: 9/27/2023 12:23 PM Patient reassessed immediately prior to procedure Patient location during procedure: pre-op Start time: 9/27/2023 10:22 AM Stop time: 9/27/2023 10:44 AM Reason for block: at surgeon's request and post-op pain management Performed by DESTINY/CAA: Nba Rachel CRNA Preanesthetic Checklist Completed: patient identified, IV checked, site marked, risks and benefits discussed, surgical consent, monitors and equipment checked, pre-op evaluation and timeout performed Prep: Pt Position: supine Sterile barriers:alcohol skin prep, gloves, mask, cap  and washed/disinfected hands Prep: ChloraPrep and air dry 3 min per clock Procedure Sedation: yes Performed under: MAC Guidance:ultrasound guided ULTRASOUND INTERPRETATION.  Using ultrasound guidance a 21 G gauge needle was placed in close proximity to the nerve, at which point, under ultrasound guidance anesthetic was injected in the area of the nerve and spread of the anesthesia was seen on ultrasound in close proximity thereto.  There were no abnormalities seen on ultrasound; a digital image was taken; and the patient tolerated the procedure with no complications. Images:still images obtained, printed/placed on chart Laterality:left Block Type:adductor canal block and popliteal Injection Technique:single-shot Needle Type:echogenic and short-bevel Needle Gauge:21 G Resistance on Injection: none Medications Comment:Pop block marcaine 0.5% 10 ml with lidocaine 2% 10 ml with decadron 10 mg Adductor canal block 0.25% 10 ml Post Assessment Injection Assessment: negative aspiration for heme, no paresthesia on injection and incremental injection Patient Tolerance:comfortable throughout block Complications:no     FL C Arm During Surgery    Result Date: 9/27/2023  This procedure was auto-finalized with no dictation required.        Medical Decision Making  Patient Presentation 52-year-old female with left ankle pain 1 day after surgery    DDX postop pain, edema, postop infection    Data Review/Analysis/Ordering unique tests reviewed and summarized previous test including labs, radiology reports, and recent surgery, and x-ray was performed    Independent Review Studies interpretation by me showed no acute changes from recent surgery hardware.  In place    Intervention/Re-evaluation no intervention    Independent Clinician no consultation    Risk Stratification tools/clinical decision rules patient did have swelling, likely from gravity she has been in a wheelchair does not have a extension to keep leg elevated, recommend  close follow-up with her surgeon in the morning continue current treatment return if symptoms worsen    Shared Decision Making discussed with the patient she agrees to this call surgeon in the morning    Disposition stable for discharge    Problems Addressed:  Post-op pain: complicated acute illness or injury    Amount and/or Complexity of Data Reviewed  Radiology: ordered.          Final diagnoses:   Post-op pain          Brant Pritchard Jr., NOEMY  09/29/23 0025

## 2023-09-30 ENCOUNTER — TELEPHONE (OUTPATIENT)
Dept: INTERNAL MEDICINE | Facility: HOSPITAL | Age: 53
End: 2023-09-30
Payer: COMMERCIAL

## 2023-09-30 ENCOUNTER — HOSPITAL ENCOUNTER (EMERGENCY)
Facility: HOSPITAL | Age: 53
Discharge: HOME | End: 2023-09-30
Attending: EMERGENCY MEDICINE
Payer: COMMERCIAL

## 2023-09-30 ENCOUNTER — PATIENT OUTREACH (OUTPATIENT)
Dept: INTERNAL MEDICINE | Facility: HOSPITAL | Age: 53
End: 2023-09-30
Payer: COMMERCIAL

## 2023-09-30 ENCOUNTER — APPOINTMENT (OUTPATIENT)
Dept: GENERAL RADIOLOGY | Facility: HOSPITAL | Age: 53
End: 2023-09-30
Payer: COMMERCIAL

## 2023-09-30 VITALS
TEMPERATURE: 97.9 F | HEART RATE: 112 BPM | BODY MASS INDEX: 25.71 KG/M2 | HEIGHT: 66 IN | OXYGEN SATURATION: 98 % | WEIGHT: 160 LBS | DIASTOLIC BLOOD PRESSURE: 59 MMHG | RESPIRATION RATE: 18 BRPM | SYSTOLIC BLOOD PRESSURE: 121 MMHG

## 2023-09-30 DIAGNOSIS — G89.18 POST-OP PAIN: ICD-10-CM

## 2023-09-30 DIAGNOSIS — M79.605 PAIN OF LEFT LOWER EXTREMITY: ICD-10-CM
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
## 2023-09-30 PROCEDURE — 73590 X-RAY EXAM OF LOWER LEG: CPT

## 2023-09-30 PROCEDURE — 99282 EMERGENCY DEPT VISIT SF MDM: CPT

## 2023-09-30 RX ORDER — CEFDINIR 300 MG/1
300 CAPSULE ORAL 2 TIMES DAILY
Qty: 14 CAPSULE | Refills: 0 | Status: SHIPPED | OUTPATIENT
Start: 2023-09-30 | End: 2023-10-07
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
## 2023-10-09 ENCOUNTER — LAB REQUISITION (OUTPATIENT)
Dept: LAB | Facility: HOSPITAL | Age: 53
End: 2023-10-09
Payer: COMMERCIAL

## 2023-10-09 DIAGNOSIS — S82.302D UNSPECIFIED FRACTURE OF LOWER END OF LEFT TIBIA, SUBSEQUENT ENCOUNTER FOR CLOSED FRACTURE WITH ROUTINE HEALING: ICD-10-CM

## 2023-10-09 PROCEDURE — 87070 CULTURE OTHR SPECIMN AEROBIC: CPT | Performed by: ORTHOPAEDIC SURGERY

## 2023-10-09 PROCEDURE — 87075 CULTR BACTERIA EXCEPT BLOOD: CPT | Performed by: ORTHOPAEDIC SURGERY

## 2023-10-09 PROCEDURE — 87205 SMEAR GRAM STAIN: CPT | Performed by: ORTHOPAEDIC SURGERY

## 2023-10-09 PROCEDURE — 87015 SPECIMEN INFECT AGNT CONCNTJ: CPT | Performed by: ORTHOPAEDIC SURGERY

## 2023-10-12 LAB
BACTERIA SPEC AEROBE CULT: NORMAL
GRAM STN SPEC: NORMAL

## 2023-10-14 LAB — BACTERIA SPEC ANAEROBE CULT: NORMAL

## 2023-12-09 ENCOUNTER — APPOINTMENT (OUTPATIENT)
Dept: GENERAL RADIOLOGY | Facility: HOSPITAL | Age: 53
End: 2023-12-09
Payer: COMMERCIAL

## 2023-12-09 ENCOUNTER — HOSPITAL ENCOUNTER (EMERGENCY)
Facility: HOSPITAL | Age: 53
Discharge: HOME | End: 2023-12-09
Attending: EMERGENCY MEDICINE
Payer: COMMERCIAL

## 2023-12-09 VITALS
HEART RATE: 110 BPM | BODY MASS INDEX: 24.91 KG/M2 | DIASTOLIC BLOOD PRESSURE: 75 MMHG | SYSTOLIC BLOOD PRESSURE: 132 MMHG | RESPIRATION RATE: 19 BRPM | TEMPERATURE: 97.7 F | WEIGHT: 155 LBS | HEIGHT: 66 IN | OXYGEN SATURATION: 95 %

## 2023-12-09 DIAGNOSIS — S99.912A INJURY OF LEFT ANKLE, INITIAL ENCOUNTER: Primary | ICD-10-CM

## 2023-12-09 PROCEDURE — 99283 EMERGENCY DEPT VISIT LOW MDM: CPT

## 2023-12-09 PROCEDURE — 73610 X-RAY EXAM OF ANKLE: CPT

## 2023-12-09 RX ORDER — IBUPROFEN 600 MG/1
600 TABLET ORAL ONCE
Status: COMPLETED | OUTPATIENT
Start: 2023-12-09 | End: 2023-12-09

## 2023-12-09 RX ADMIN — IBUPROFEN 600 MG: 600 TABLET, FILM COATED ORAL at 11:29

## 2023-12-09 NOTE — ED NOTES
"Pt provided w/ a list of shelters and phone numbers to call  that was provided per SW- April. This nurse called all of the numbers on the list for the pt, and Praccel request that the pt call for an assessment over the phone and to discuss possible admission and placement for her dog. Pt was provided an ED phone and called Praccel. Pt states that she probably is not going to go anywhere without her dog because that would be mean to leave her dog out in the cold. Pt states that she is going to call Praccel back from her phone once she is discharged. Provided pt w/ a food bag, snack packs and socks. Provided pt w/ a block to charge her phone while in the room.   Pt states that she is going to call her cousin \"Linnea\" and possibly go back to [place] with her.   "

## 2023-12-09 NOTE — PROGRESS NOTES
Case Management/Social Work    Patient Name:  Rashida Valles  YOB: 1970  MRN: [id number]  Admit Date:  12/9/2023        This on call SW was contacted by ED MODESTO Herbert regarding this pt presenting as homeless  and needing resources. SW spoke with pt via phone and she is requesting shelter information to accommodate her and her dog. SW explained that most shelters will not accommodate a pet but still provided a list of shelters to try. Pt also requesting a wheelchair and SW explained this may possibly be provided through the ED. SW also discussed getting pt's prescriptions switched to another pharmacy as she voiced no longer feeling comfortable going to her current pharmacy but she confirms that she is able to afford her meds. No further needs voiced this day.      Electronically signed by:  China Hu LCSW  12/09/23 13:51 EST
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
## 2023-12-09 NOTE — DISCHARGE INSTRUCTIONS
Rest, ice and elevate to help with pain and swelling of the ankle.  You can follow back up with your orthopedist Dr. Josue for further outpatient evaluation.  You can take ibuprofen and/or Tylenol to help with pain.  Follow-up with primary care provider as early as possible, may contact patient connection to establish care.  Return to the ER for any change, worsening symptoms, or any additional concerns.

## (undated) DEVICE — DISPOSABLE TOURNIQUET CUFF SINGLE BLADDER, SINGLE PORT AND QUICK CONNECT CONNECTOR: Brand: COLOR CUFF

## (undated) DEVICE — CLAVICLE STRAP: Brand: DEROYAL

## (undated) DEVICE — SYS CLS SKIN PREMIERPRO EXOFINFUSION 22CM

## (undated) DEVICE — PK EXTRM LOWR 20

## (undated) DEVICE — CAST PADDING 6 IN KIT: Brand: CARDINAL HEALTH

## (undated) DEVICE — STERILE CAST PADDING KIT: Brand: CARDINAL HEALTH

## (undated) DEVICE — SHEET,DRAPE,70X100,STERILE: Brand: MEDLINE

## (undated) DEVICE — TBG PENCL TELESCP MEGADYNE SMOKE EVAC 10FT

## (undated) DEVICE — BNDG ELAS MATRX V/CLS 6IN 5YD LF

## (undated) DEVICE — DRILL,  2.8 X 140MM, SOLID, MEASURING, LONG, AO: Brand: BABY GORILLA®/GORILLA® PLATING SYSTEM

## (undated) DEVICE — GLV SURG SENSICARE GREEN W/ALOE PF LF 8 STRL

## (undated) DEVICE — DISPOSABLE TOURNIQUET CUFF 30"X4", 1-LINE, WHITE, STERILE, 1EA/PK, 10PK/CS: Brand: ASP MEDICAL

## (undated) DEVICE — GLV SURG SENSICARE W/ALOE PF LF 7.5 STRL

## (undated) DEVICE — BNDG ELAS MATRX V/CLS 4IN 5YD LF

## (undated) DEVICE — SUT VIC 2/0 CT1 27IN J259H

## (undated) DEVICE — SYR LUERLOK 20CC

## (undated) DEVICE — FLEXIBLE YANKAUER,MEDIUM TIP, NO VACUUM CONTROL: Brand: ARGYLE

## (undated) DEVICE — SLV SCD CALF HEMOFORCE DVT THERP REPROC MD

## (undated) DEVICE — K-WIRE, SINGLE ENDED TROCAR TIP, SMOOTH, 1.2 X 150MM
Type: IMPLANTABLE DEVICE | Site: ANKLE | Status: NON-FUNCTIONAL
Brand: MONSTER SCREW SYSTEM
Removed: 2023-09-27